# Patient Record
Sex: MALE | Race: WHITE | Employment: OTHER | URBAN - METROPOLITAN AREA
[De-identification: names, ages, dates, MRNs, and addresses within clinical notes are randomized per-mention and may not be internally consistent; named-entity substitution may affect disease eponyms.]

---

## 2020-09-22 NOTE — PERIOP NOTES
PATIENT'S WIFE CALLED AND MADE AWARE OF COVID-19 TESTING REQUIRED TO BE DONE WITHIN 96 HOURS OF SURGERY. COVID-19 TESTING APPOINTMENT MADE FOR PATIENT. PATIENT'S WIFE INSTRUCTED ON SELF QUARANTINE BETWEEN TESTING AND ARRIVAL TIME DAY OF SURGERY. INSTRUCTED NOT TO USE NASAL SPRAY OR NASAL OINTMENTS DAY OF TESTING, PRIOR TO TEST. LOCATION OF TESTING DISCUSSED WITH PATIENT'S WIFE.

## 2020-09-25 ENCOUNTER — HOSPITAL ENCOUNTER (OUTPATIENT)
Dept: PREADMISSION TESTING | Age: 65
Discharge: HOME | End: 2020-09-25
Payer: COMMERCIAL

## 2020-09-25 VITALS
TEMPERATURE: 98.8 F | HEART RATE: 75 BPM | SYSTOLIC BLOOD PRESSURE: 143 MMHG | HEIGHT: 69 IN | DIASTOLIC BLOOD PRESSURE: 90 MMHG | WEIGHT: 238.54 LBS | BODY MASS INDEX: 35.33 KG/M2

## 2020-09-25 DIAGNOSIS — Z01.812 PRE-PROCEDURE LAB EXAM: ICD-10-CM

## 2020-09-25 PROBLEM — S83.241A ACUTE MEDIAL MENISCUS TEAR OF RIGHT KNEE: Status: ACTIVE | Noted: 2020-09-25

## 2020-09-25 PROCEDURE — 93005 ELECTROCARDIOGRAM TRACING: CPT

## 2020-09-25 PROCEDURE — 87635 SARS-COV-2 COVID-19 AMP PRB: CPT

## 2020-09-25 RX ORDER — BUPROPION HYDROCHLORIDE 300 MG/1
300 TABLET ORAL
COMMUNITY

## 2020-09-25 RX ORDER — CITALOPRAM 40 MG/1
40 TABLET, FILM COATED ORAL DAILY
COMMUNITY

## 2020-09-25 RX ORDER — NAPROXEN SODIUM 220 MG
220 TABLET ORAL AS NEEDED
COMMUNITY
End: 2021-04-13

## 2020-09-25 RX ORDER — HYDROGEN PEROXIDE 3 %
20 SOLUTION, NON-ORAL MISCELLANEOUS DAILY
COMMUNITY

## 2020-09-25 RX ORDER — MINOCYCLINE HYDROCHLORIDE 50 MG/1
50 CAPSULE ORAL DAILY
COMMUNITY

## 2020-09-25 NOTE — H&P
PCP: Mine Campbell MD      Problem List      None        Subjective: There is no problem list on file for this patient.     Chief Complaint: Pain and Follow-up of the Right Knee     HPI: Vera Emanuel is a 60 y.o. male who returns for follow-up on right knee pain. He presents today for MRI results. He reports continued right knee pain.     Objective:      There were no vitals filed for this visit. Height: 5' 9\"       Wt Readings from Last 3 Encounters:   09/22/20 235 lb   09/10/20 235 lb   08/19/20 235 lb      Body mass index is 34.7 kg/m².     Musculoskeletal : He has normal range of motion of the right knee with no significant pain on motion. He has tenderness in the popliteal fossa compatible with a Baker's cyst. Ligaments intact. No effusion. Positive patellofemoral grind. Positive Seema's sign. Strong pedal pulses. No pedal edema. Skin healthy and intact. No apparent atrophy. Leg lengths appear equal.        Radiographs:             Mri Knee Right Without Contrast (20096)     Result Date: 9/17/2020  [facility] MRI INDICATION: Knee pain COMPARISON: None TECHNIQUE: Axial T2 fat-saturated and proton density fat-saturated; coronal T1 and proton density fat-saturated; and sagittal T2 fat-saturated, proton density fat-saturated, and gradient echo MRI of the right knee . CONTRAST:  None. FINDINGS: Bone marrow:     No acute fracture, dislocation, or marrow replacing process. Joint fluid: Mild joint effusion. Collateral ligaments and posterior, lateral corner: Mild edema seen along the MCL. The fibers are intact. The LCL is intact. Medial meniscus: There is high-grade tearing of the posterior root of the medial meniscus with partial meniscal extrusion. Degenerative signal is seen throughout the medial meniscus. Lateral meniscus: Intact. ACL and PCL: Intact. Tendons: There is a small focus of increased signal in the origin of the lateral head of the gastrocnemius related to degeneration. Otherwise intact.  Muscles: Within normal limits. Patellofemoral alignment: No patellar subluxation/tilt. Trochlear groove is not hypoplastic. TT-TG distance: 10 mm. Articular cartilage: There is high-grade cartilage loss throughout the lateral aspect of the patellofemoral compartment with areas of mild surrounding degenerative edema. There is a small superficial cartilage defect in the lateral tibial plateau measuring 4 mm. Soft tissue mass: None. IMPRESSION: 1. High-grade tearing of the posterior root of the medial meniscus with partial meniscal extrusion. Mild edema along the MCL is likely related to this. 2. Severe osteoarthritis in the lateral aspect of the patellofemoral compartment. Jessica Perdomo     Assessment:      1. Acute medial meniscal tear, right, initial encounter    2. Primary osteoarthritis of right knee       Plan:      I reviewed the MRI ordered of the right knee on 09/16/2020 with the patient. He has severe osteoarthritis in the lateral aspect of the patellofemoral compartment and high-grade tearing of the posterior root of the medial meniscus with partial meniscal extrusion. I discussed with the patient the diagnosis of meniscal tear. We discussed treatment options including continued conservative management vs surgery. He opts to proceed with R knee arthroscopy with partial menisectomy. Follow-up for scheduled surgery. I advised patient to let us know if he develops any new or worsening symptoms. Follow-up on a PRN basis.          Orders Placed This Encounter    Surgical Posting Sheet    BMI >=25 PATIENT INSTRUCTIONS & EDUCATION      Return for surgery.      Medical documentation was entered by me, Sanjuanita Granda, Medical Scribe for Dr. Josep Bingham.    9/22/2020     I, Lorrie Luna MD, personally, performed the services described in this documentation, as scribed in my presence, and it is both accurate and complete.         Electronically signed by Lorrie Luna MD at 9/24/2020  5:39 PM

## 2020-09-25 NOTE — PERIOP NOTES
Patient given surgical site infection information FAQs handout and hand hygiene tips sheet. Pre-operative instructions reviewed and patient verbalizes understanding of instructions. Patient has been given the opportunity to ask additional questions. PATIENT GIVEN 2 BOTTLES OF CHG SOAP TO USE DAY BEFORE SURGERY AND DOS. INSTRUCTIONS PROVIDED. PT ADVISED TO NOT EAT SOLID FOODS AFTER MIDNIGHT THE NIGHT BEFORE SURGERY INCLUDING CANDY,MINTS OR GUM. DO NOT DRINK ALCOHOL 24 HOURS BEFORE SURGERY. PT MAY DRINK CLEAR LIQUIDS FROM 12 MIDNIGHT UNTIL 1 HOUR PRIOR TO ARRIVAL. PT INSTRUCTED TO CALL REGISTRATION UPON ARRIVAL TO HOSPITAL DOS. PT IS SCHEDULED FOR COVID TESTING.

## 2020-09-26 LAB
ATRIAL RATE: 71 BPM
CALCULATED P AXIS, ECG09: 57 DEGREES
CALCULATED R AXIS, ECG10: -2 DEGREES
CALCULATED T AXIS, ECG11: 17 DEGREES
DIAGNOSIS, 93000: NORMAL
P-R INTERVAL, ECG05: 160 MS
Q-T INTERVAL, ECG07: 450 MS
QRS DURATION, ECG06: 96 MS
QTC CALCULATION (BEZET), ECG08: 489 MS
SARS-COV-2, COV2NT: NOT DETECTED
VENTRICULAR RATE, ECG03: 71 BPM

## 2020-09-28 ENCOUNTER — ANESTHESIA EVENT (OUTPATIENT)
Dept: MEDSURG UNIT | Age: 65
End: 2020-09-28
Payer: COMMERCIAL

## 2020-09-29 ENCOUNTER — HOSPITAL ENCOUNTER (OUTPATIENT)
Age: 65
Setting detail: OUTPATIENT SURGERY
Discharge: HOME | End: 2020-09-29
Attending: ORTHOPAEDIC SURGERY | Admitting: ORTHOPAEDIC SURGERY
Payer: COMMERCIAL

## 2020-09-29 ENCOUNTER — ANESTHESIA (OUTPATIENT)
Dept: MEDSURG UNIT | Age: 65
End: 2020-09-29
Payer: COMMERCIAL

## 2020-09-29 VITALS
HEART RATE: 84 BPM | DIASTOLIC BLOOD PRESSURE: 89 MMHG | WEIGHT: 238 LBS | OXYGEN SATURATION: 98 % | SYSTOLIC BLOOD PRESSURE: 124 MMHG | TEMPERATURE: 97.4 F | RESPIRATION RATE: 14 BRPM | BODY MASS INDEX: 35.25 KG/M2 | HEIGHT: 69 IN

## 2020-09-29 DIAGNOSIS — S83.241D ACUTE MEDIAL MENISCUS TEAR OF RIGHT KNEE, SUBSEQUENT ENCOUNTER: Primary | ICD-10-CM

## 2020-09-29 PROBLEM — M94.262 CHONDROMALACIA OF KNEE, LEFT: Chronic | Status: ACTIVE | Noted: 2020-09-29

## 2020-09-29 PROBLEM — S83.272A COMPLEX TEAR OF LATERAL MENISCUS OF LEFT KNEE AS CURRENT INJURY: Chronic | Status: ACTIVE | Noted: 2020-09-29

## 2020-09-29 PROCEDURE — 77030008684 HC TU ET CUF COVD -B: Performed by: ANESTHESIOLOGY

## 2020-09-29 PROCEDURE — 74011250636 HC RX REV CODE- 250/636: Performed by: ORTHOPAEDIC SURGERY

## 2020-09-29 PROCEDURE — 77030002916 HC SUT ETHLN J&J -A: Performed by: ORTHOPAEDIC SURGERY

## 2020-09-29 PROCEDURE — 97161 PT EVAL LOW COMPLEX 20 MIN: CPT

## 2020-09-29 PROCEDURE — 74011250636 HC RX REV CODE- 250/636: Performed by: ANESTHESIOLOGY

## 2020-09-29 PROCEDURE — 74011000250 HC RX REV CODE- 250: Performed by: ORTHOPAEDIC SURGERY

## 2020-09-29 PROCEDURE — 2709999900 HC NON-CHARGEABLE SUPPLY: Performed by: ORTHOPAEDIC SURGERY

## 2020-09-29 PROCEDURE — 77030020753 HC CUF TRNQT 1BLA STRY -B: Performed by: ORTHOPAEDIC SURGERY

## 2020-09-29 PROCEDURE — 74011250636 HC RX REV CODE- 250/636: Performed by: NURSE ANESTHETIST, CERTIFIED REGISTERED

## 2020-09-29 PROCEDURE — 77030018835 HC SOL IRR LR ICUM -A: Performed by: ORTHOPAEDIC SURGERY

## 2020-09-29 PROCEDURE — 77030040361 HC SLV COMPR DVT MDII -B: Performed by: ORTHOPAEDIC SURGERY

## 2020-09-29 PROCEDURE — 74011000250 HC RX REV CODE- 250: Performed by: NURSE ANESTHETIST, CERTIFIED REGISTERED

## 2020-09-29 PROCEDURE — 76210000037 HC AMBSU PH I REC 2 TO 2.5 HR: Performed by: ORTHOPAEDIC SURGERY

## 2020-09-29 PROCEDURE — 77030026438 HC STYL ET INTUB CARD -A: Performed by: ANESTHESIOLOGY

## 2020-09-29 PROCEDURE — 76060000061 HC AMB SURG ANES 0.5 TO 1 HR: Performed by: ORTHOPAEDIC SURGERY

## 2020-09-29 PROCEDURE — 76030000000 HC AMB SURG OR TIME 0.5 TO 1: Performed by: ORTHOPAEDIC SURGERY

## 2020-09-29 PROCEDURE — 77030006884 HC BLD SHV INCIS S&N -B: Performed by: ORTHOPAEDIC SURGERY

## 2020-09-29 PROCEDURE — 74011250637 HC RX REV CODE- 250/637: Performed by: ANESTHESIOLOGY

## 2020-09-29 RX ORDER — OXYCODONE HYDROCHLORIDE 5 MG/1
5 TABLET ORAL
Qty: 20 TAB | Refills: 0 | Status: SHIPPED | OUTPATIENT
Start: 2020-09-29 | End: 2020-10-03

## 2020-09-29 RX ORDER — CEFAZOLIN SODIUM 1 G/3ML
INJECTION, POWDER, FOR SOLUTION INTRAMUSCULAR; INTRAVENOUS AS NEEDED
Status: DISCONTINUED | OUTPATIENT
Start: 2020-09-29 | End: 2020-09-29 | Stop reason: HOSPADM

## 2020-09-29 RX ORDER — HYDROCODONE BITARTRATE AND ACETAMINOPHEN 5; 325 MG/1; MG/1
1 TABLET ORAL AS NEEDED
Status: DISCONTINUED | OUTPATIENT
Start: 2020-09-29 | End: 2020-09-29 | Stop reason: HOSPADM

## 2020-09-29 RX ORDER — SODIUM CHLORIDE, SODIUM LACTATE, POTASSIUM CHLORIDE, CALCIUM CHLORIDE 600; 310; 30; 20 MG/100ML; MG/100ML; MG/100ML; MG/100ML
INJECTION, SOLUTION INTRAVENOUS
Status: DISCONTINUED | OUTPATIENT
Start: 2020-09-29 | End: 2020-09-29 | Stop reason: HOSPADM

## 2020-09-29 RX ORDER — ONDANSETRON 2 MG/ML
INJECTION INTRAMUSCULAR; INTRAVENOUS AS NEEDED
Status: DISCONTINUED | OUTPATIENT
Start: 2020-09-29 | End: 2020-09-29 | Stop reason: HOSPADM

## 2020-09-29 RX ORDER — LIDOCAINE HYDROCHLORIDE AND EPINEPHRINE 10; 10 MG/ML; UG/ML
INJECTION, SOLUTION INFILTRATION; PERINEURAL AS NEEDED
Status: DISCONTINUED | OUTPATIENT
Start: 2020-09-29 | End: 2020-09-29 | Stop reason: HOSPADM

## 2020-09-29 RX ORDER — SODIUM CHLORIDE, SODIUM LACTATE, POTASSIUM CHLORIDE, CALCIUM CHLORIDE 600; 310; 30; 20 MG/100ML; MG/100ML; MG/100ML; MG/100ML
1000 INJECTION, SOLUTION INTRAVENOUS CONTINUOUS
Status: DISCONTINUED | OUTPATIENT
Start: 2020-09-29 | End: 2020-09-29 | Stop reason: HOSPADM

## 2020-09-29 RX ORDER — HYDROMORPHONE HYDROCHLORIDE 1 MG/ML
0.2 INJECTION, SOLUTION INTRAMUSCULAR; INTRAVENOUS; SUBCUTANEOUS
Status: DISCONTINUED | OUTPATIENT
Start: 2020-09-29 | End: 2020-09-29 | Stop reason: HOSPADM

## 2020-09-29 RX ORDER — MIDAZOLAM HYDROCHLORIDE 1 MG/ML
1 INJECTION, SOLUTION INTRAMUSCULAR; INTRAVENOUS AS NEEDED
Status: DISCONTINUED | OUTPATIENT
Start: 2020-09-29 | End: 2020-09-29 | Stop reason: HOSPADM

## 2020-09-29 RX ORDER — FENTANYL CITRATE 50 UG/ML
25 INJECTION, SOLUTION INTRAMUSCULAR; INTRAVENOUS
Status: COMPLETED | OUTPATIENT
Start: 2020-09-29 | End: 2020-09-29

## 2020-09-29 RX ORDER — ALBUTEROL SULFATE 0.83 MG/ML
2.5 SOLUTION RESPIRATORY (INHALATION) AS NEEDED
Status: DISCONTINUED | OUTPATIENT
Start: 2020-09-29 | End: 2020-09-29 | Stop reason: HOSPADM

## 2020-09-29 RX ORDER — ONDANSETRON 2 MG/ML
4 INJECTION INTRAMUSCULAR; INTRAVENOUS AS NEEDED
Status: DISCONTINUED | OUTPATIENT
Start: 2020-09-29 | End: 2020-09-29 | Stop reason: HOSPADM

## 2020-09-29 RX ORDER — ROCURONIUM BROMIDE 10 MG/ML
INJECTION, SOLUTION INTRAVENOUS AS NEEDED
Status: DISCONTINUED | OUTPATIENT
Start: 2020-09-29 | End: 2020-09-29 | Stop reason: HOSPADM

## 2020-09-29 RX ORDER — ACETAMINOPHEN 325 MG/1
650 TABLET ORAL ONCE
Status: COMPLETED | OUTPATIENT
Start: 2020-09-29 | End: 2020-09-29

## 2020-09-29 RX ORDER — FENTANYL CITRATE 50 UG/ML
50 INJECTION, SOLUTION INTRAMUSCULAR; INTRAVENOUS AS NEEDED
Status: DISCONTINUED | OUTPATIENT
Start: 2020-09-29 | End: 2020-09-29 | Stop reason: HOSPADM

## 2020-09-29 RX ORDER — GLYCOPYRROLATE 0.2 MG/ML
0.2 INJECTION INTRAMUSCULAR; INTRAVENOUS
Status: DISCONTINUED | OUTPATIENT
Start: 2020-09-29 | End: 2020-09-29 | Stop reason: HOSPADM

## 2020-09-29 RX ORDER — LIDOCAINE HYDROCHLORIDE 10 MG/ML
0.1 INJECTION, SOLUTION EPIDURAL; INFILTRATION; INTRACAUDAL; PERINEURAL AS NEEDED
Status: DISCONTINUED | OUTPATIENT
Start: 2020-09-29 | End: 2020-09-29 | Stop reason: HOSPADM

## 2020-09-29 RX ORDER — LIDOCAINE HYDROCHLORIDE 20 MG/ML
INJECTION, SOLUTION EPIDURAL; INFILTRATION; INTRACAUDAL; PERINEURAL AS NEEDED
Status: DISCONTINUED | OUTPATIENT
Start: 2020-09-29 | End: 2020-09-29 | Stop reason: HOSPADM

## 2020-09-29 RX ORDER — MIDAZOLAM HYDROCHLORIDE 1 MG/ML
0.5 INJECTION, SOLUTION INTRAMUSCULAR; INTRAVENOUS
Status: DISCONTINUED | OUTPATIENT
Start: 2020-09-29 | End: 2020-09-29 | Stop reason: HOSPADM

## 2020-09-29 RX ORDER — MORPHINE SULFATE 10 MG/ML
2 INJECTION, SOLUTION INTRAMUSCULAR; INTRAVENOUS
Status: DISCONTINUED | OUTPATIENT
Start: 2020-09-29 | End: 2020-09-29 | Stop reason: HOSPADM

## 2020-09-29 RX ORDER — MIDAZOLAM HYDROCHLORIDE 1 MG/ML
INJECTION, SOLUTION INTRAMUSCULAR; INTRAVENOUS AS NEEDED
Status: DISCONTINUED | OUTPATIENT
Start: 2020-09-29 | End: 2020-09-29 | Stop reason: HOSPADM

## 2020-09-29 RX ORDER — SODIUM CHLORIDE 9 MG/ML
25 INJECTION, SOLUTION INTRAVENOUS CONTINUOUS
Status: DISCONTINUED | OUTPATIENT
Start: 2020-09-29 | End: 2020-09-29 | Stop reason: HOSPADM

## 2020-09-29 RX ORDER — FENTANYL CITRATE 50 UG/ML
INJECTION, SOLUTION INTRAMUSCULAR; INTRAVENOUS AS NEEDED
Status: DISCONTINUED | OUTPATIENT
Start: 2020-09-29 | End: 2020-09-29 | Stop reason: HOSPADM

## 2020-09-29 RX ORDER — ASPIRIN 81 MG/1
81 TABLET ORAL 2 TIMES DAILY
Qty: 28 TAB | Refills: 0 | Status: SHIPPED | OUTPATIENT
Start: 2020-09-29 | End: 2021-04-13

## 2020-09-29 RX ORDER — PROPOFOL 10 MG/ML
INJECTION, EMULSION INTRAVENOUS AS NEEDED
Status: DISCONTINUED | OUTPATIENT
Start: 2020-09-29 | End: 2020-09-29 | Stop reason: HOSPADM

## 2020-09-29 RX ORDER — DEXAMETHASONE SODIUM PHOSPHATE 4 MG/ML
INJECTION, SOLUTION INTRA-ARTICULAR; INTRALESIONAL; INTRAMUSCULAR; INTRAVENOUS; SOFT TISSUE AS NEEDED
Status: DISCONTINUED | OUTPATIENT
Start: 2020-09-29 | End: 2020-09-29 | Stop reason: HOSPADM

## 2020-09-29 RX ORDER — ROPIVACAINE HYDROCHLORIDE 5 MG/ML
30 INJECTION, SOLUTION EPIDURAL; INFILTRATION; PERINEURAL AS NEEDED
Status: DISCONTINUED | OUTPATIENT
Start: 2020-09-29 | End: 2020-09-29 | Stop reason: HOSPADM

## 2020-09-29 RX ORDER — SUCCINYLCHOLINE CHLORIDE 20 MG/ML
INJECTION INTRAMUSCULAR; INTRAVENOUS AS NEEDED
Status: DISCONTINUED | OUTPATIENT
Start: 2020-09-29 | End: 2020-09-29 | Stop reason: HOSPADM

## 2020-09-29 RX ORDER — ROPIVACAINE HYDROCHLORIDE 5 MG/ML
INJECTION, SOLUTION EPIDURAL; INFILTRATION; PERINEURAL AS NEEDED
Status: DISCONTINUED | OUTPATIENT
Start: 2020-09-29 | End: 2020-09-29 | Stop reason: HOSPADM

## 2020-09-29 RX ORDER — DIPHENHYDRAMINE HYDROCHLORIDE 50 MG/ML
12.5 INJECTION, SOLUTION INTRAMUSCULAR; INTRAVENOUS AS NEEDED
Status: DISCONTINUED | OUTPATIENT
Start: 2020-09-29 | End: 2020-09-29 | Stop reason: HOSPADM

## 2020-09-29 RX ADMIN — DEXAMETHASONE SODIUM PHOSPHATE 6 MG: 4 INJECTION, SOLUTION INTRAMUSCULAR; INTRAVENOUS at 10:24

## 2020-09-29 RX ADMIN — PROPOFOL 150 MG: 10 INJECTION, EMULSION INTRAVENOUS at 10:14

## 2020-09-29 RX ADMIN — LIDOCAINE HYDROCHLORIDE 80 MG: 20 INJECTION, SOLUTION EPIDURAL; INFILTRATION; INTRACAUDAL; PERINEURAL at 10:14

## 2020-09-29 RX ADMIN — ROCURONIUM BROMIDE 5 MG: 10 SOLUTION INTRAVENOUS at 10:14

## 2020-09-29 RX ADMIN — SODIUM CHLORIDE, SODIUM LACTATE, POTASSIUM CHLORIDE, AND CALCIUM CHLORIDE 1000 ML: 600; 310; 30; 20 INJECTION, SOLUTION INTRAVENOUS at 08:53

## 2020-09-29 RX ADMIN — ONDANSETRON HYDROCHLORIDE 4 MG: 2 INJECTION, SOLUTION INTRAMUSCULAR; INTRAVENOUS at 10:44

## 2020-09-29 RX ADMIN — HYDROCODONE BITARTRATE AND ACETAMINOPHEN 1 TABLET: 5; 325 TABLET ORAL at 11:46

## 2020-09-29 RX ADMIN — FENTANYL CITRATE 25 MCG: 50 INJECTION, SOLUTION INTRAMUSCULAR; INTRAVENOUS at 11:24

## 2020-09-29 RX ADMIN — FENTANYL CITRATE 25 MCG: 50 INJECTION, SOLUTION INTRAMUSCULAR; INTRAVENOUS at 11:41

## 2020-09-29 RX ADMIN — MIDAZOLAM 2 MG: 1 INJECTION INTRAMUSCULAR; INTRAVENOUS at 10:06

## 2020-09-29 RX ADMIN — FENTANYL CITRATE 25 MCG: 50 INJECTION, SOLUTION INTRAMUSCULAR; INTRAVENOUS at 10:21

## 2020-09-29 RX ADMIN — SODIUM CHLORIDE, SODIUM LACTATE, POTASSIUM CHLORIDE, AND CALCIUM CHLORIDE: 600; 310; 30; 20 INJECTION, SOLUTION INTRAVENOUS at 10:04

## 2020-09-29 RX ADMIN — SUCCINYLCHOLINE CHLORIDE 200 MG: 20 INJECTION, SOLUTION INTRAMUSCULAR; INTRAVENOUS at 10:14

## 2020-09-29 RX ADMIN — FENTANYL CITRATE 25 MCG: 50 INJECTION, SOLUTION INTRAMUSCULAR; INTRAVENOUS at 11:12

## 2020-09-29 RX ADMIN — ACETAMINOPHEN 650 MG: 325 TABLET ORAL at 08:55

## 2020-09-29 RX ADMIN — ONDANSETRON HYDROCHLORIDE 4 MG: 2 INJECTION, SOLUTION INTRAMUSCULAR; INTRAVENOUS at 10:16

## 2020-09-29 RX ADMIN — FENTANYL CITRATE 50 MCG: 50 INJECTION, SOLUTION INTRAMUSCULAR; INTRAVENOUS at 10:14

## 2020-09-29 RX ADMIN — CEFAZOLIN 2 G: 330 INJECTION, POWDER, FOR SOLUTION INTRAMUSCULAR; INTRAVENOUS at 10:23

## 2020-09-29 RX ADMIN — FENTANYL CITRATE 25 MCG: 50 INJECTION, SOLUTION INTRAMUSCULAR; INTRAVENOUS at 10:52

## 2020-09-29 RX ADMIN — FENTANYL CITRATE 25 MCG: 50 INJECTION, SOLUTION INTRAMUSCULAR; INTRAVENOUS at 11:48

## 2020-09-29 NOTE — BRIEF OP NOTE
Brief Postoperative Note    Patient: Judie Moy  YOB: 1955  MRN: [id number]    Date of Procedure: 9/29/2020     Pre-Op Diagnosis: MEDIAL AND LATERA MENISCAL TEAR RIGHT KNEE WITH CHONDROMALACIA MEDIAL FEMORAL CONDYLE  Post-Op Diagnosis: Same as preoperative diagnosis.       Procedure(s):  RIGHT KNEE ARTHROSCOPIC PARTIAL MEDIAL AND PARTIAL LATERAL MENISCECTOMY    Surgeon(s):  Maddie Hodgson MD    Surgical Assistant: None    Anesthesia: General     Estimated Blood Loss (mL): Minimal    Complications: None    Specimens: * No specimens in log *     Implants: * No implants in log *    Drains: * No LDAs found *    Findings: as above    Electronically Signed by Joy Glasgow MD on 9/29/2020 at 10:56 AM

## 2020-09-29 NOTE — PROGRESS NOTES
Orders received, chart reviewed and patient evaluated by physical therapy. recommend:  No skilled physical therapy/ follow up rehabilitation needs identified at this time. Full evaluation to follow.

## 2020-09-29 NOTE — PROGRESS NOTES
PHYSICAL THERAPY EVALUATION & DISCHARGE  (AMBULATORY SURGERY, EMERGENCY ROOM & RECOVERY ROOM PATIENTS)  Patient: Renny Washington (63 y.o. male)  Date: 9/29/2020  Primary Diagnosis: MEDIAL MENICAL TEAR RIGHT KNEE  Procedure(s) (LRB):  RIGHT KNEE ARTHROSCOPIC PARTIAL MEDIAL AND PARTIAL LATERAL MENISCECTOMY (Right) Day of Surgery   Ordered Weight Bearing Status:  right as tolerated  Ordered Equipment: crutches    ASSESSMENT :   Based on the objective data described below, patient presents with Modified independent level overall for transfers. Gait training completed at Modified independent, 15 feet and using a crutches. Educated on proper technique using crutches while navigating stairs. Pt states no concerns. Adjusted crutches to proper height. Discharge recommendations: stairs with supervision      PLAN :  Skilled intervention and education completed. Discharging further skilled acute physical therapy at this time. SUBJECTIVE:   Patient stated I think I am good.     OBJECTIVE DATA SUMMARY:   HISTORY:    Past Medical History:   Diagnosis Date    Arrhythmia 2015    ATRIAL FLUTTER    Arthritis     GERD (gastroesophageal reflux disease)     H/O motion sickness     Psychiatric disorder     ANXIETY,DEPRESSION     Past Surgical History:   Procedure Laterality Date    HX CHOLECYSTECTOMY      HX COLONOSCOPY      HX ENDOSCOPY      HX GI      HX ORTHOPAEDIC Left     SHOULDER    HX ORTHOPAEDIC Left     THUMB    HX ORTHOPAEDIC Right     THUMB    HX TONSILLECTOMY       Prior Level of Function/Home Situation: independent   Personal factors and/or comorbidities impacting plan of care: n/a    Home Situation  [facility]    EXAMINATION/PRESENTATION/DECISION MAKING:   Critical Behavior:              Hearing:   Auditory  Auditory Impairment: None    Strength and Range Of Motion limitations:  Limited on RLE 2/2 surgery  Sensation:   Impaired on RLE    Transfers:  Overall level of assistance required following instruction: modified independence given verbal using axillary crutches. Ambulation/gait training:  Weight bearing status during ambulation: As tolerated     Distance (ft): 15 Feet (ft)  Assistive Device: Crutches  Ambulation - Level of Assistance: Modified independent, Adaptive equipment, Additional time       Stair Management:             Functional Measure:  Barthel Index:    Bathin  Bladder: 10  Bowels: 10  Groomin  Dressin  Feeding: 10  Mobility: 15  Stairs: 5  Toilet Use: 10  Transfer (Bed to Chair and Back): 15  Total: 90/100       The Barthel ADL Index: Guidelines  1. The index should be used as a record of what a patient does, not as a record of what a patient could do. 2. The main aim is to establish degree of independence from any help, physical or verbal, however minor and for whatever reason. 3. The need for supervision renders the patient not independent. 4. A patient's performance should be established using the best available evidence. Asking the patient, friends/relatives and nurses are the usual sources, but direct observation and common sense are also important. However direct testing is not needed. 5. Usually the patient's performance over the preceding 24-48 hours is important, but occasionally longer periods will be relevant. 6. Middle categories imply that the patient supplies over 50 per cent of the effort. 7. Use of aids to be independent is allowed. Juanito Estrella., Barthel, D.W. (9860). Functional evaluation: the Barthel Index. [address] [facility] (14)2. [facility] MARY Alvarenga, Warren Anaya., Pantera Ambrocio.[address] (). Measuring the change indisability after inpatient rehabilitation; comparison of the responsiveness of the Barthel Index and Functional [place] Measure. Journal of Neurology, Neurosurgery, and Psychiatry, 66(4), 251-601.   Praveena Raines, CELESTINE.LILLIAN.A, NURIA Valente, Ethel Guzman, M.A. (2004.) Assessment of post-stroke quality of life in cost-effectiveness studies: The usefulness of the Barthel Index and the EuroQoL-5D. Quality of Life Research, 15, 789-83            Physical Therapy Evaluation Charge Determination   History Examination Presentation Decision-Making   MEDIUM  Complexity : 1-2 comorbidities / personal factors will impact the outcome/ POC  LOW Complexity : 1-2 Standardized tests and measures addressing body structure, function, activity limitation and / or participation in recreation  LOW Complexity : Stable, uncomplicated  Other outcome measures barthel   LOW       Based on the above components, the patient evaluation is determined to be of the following complexity level: LOW         Activity Tolerance:   Good  Please refer to the flowsheet for vital signs taken during this treatment. After treatment patient left:   Up in chair     COMMUNICATION/EDUCATION:   Role of physical therapy explained to the patient. The patients plan of care was discussed with: Registered Nurse.      Topics addressed: Comments:   [x]                                    Device use and technique    [x]                                    Transfer technique    [x]                                    Gait training    []                                    Stair training deferred     Thank you for this referral.    Aretha Manzo, PT, DPT   Time Calculation: 10 mins

## 2020-09-29 NOTE — OP NOTES
[address]   OPERATIVE REPORT    Name:  Shirin Berman  MR#:  [id number]  :  1955  ACCOUNT #:  [de-identified]  DATE OF SERVICE:  2020      PREOPERATIVE DIAGNOSIS:  Right knee medial meniscal tear. POSTOPERATIVE DIAGNOSES:  Right knee medial meniscal tear, lateral meniscal tear, chondromalacia medial femoral condyle, chondromalacia patellofemoral joint. PROCEDURES PERFORMED:  Right knee arthroscopic partial medial and partial lateral meniscectomy with chondroplasty of medial femoral condyle. SURGEON:  Sadie Wilcox MD    ASSISTANT:  None. ANESTHESIA:  General.    COMPLICATIONS:  None. SPECIMENS REMOVED:  None. IMPLANTS:  None. ESTIMATED BLOOD LOSS:  Minimal.    INDICATIONS:  A 44-year-old gentleman with pain and mechanical symptoms localized to the medial joint, not responsive to conservative management with a positive MRI scan. OPERATION:  The patient was taken to the operating room and underwent general inhalational anesthesia. Right knee was prepped and draped in the usual fashion. The 2 proposed portal sites and joint space were infiltrated with 1% lidocaine with epinephrine. The tourniquet was in place, however, was not utilized. The anterolateral portal was established. Scope introduced in the patellofemoral joint. Grade III and grade IV chondromalacia were noted on the undersurface of the patella as well as the femoral trochlea. Scope was advanced down the medial gutter into the medial joint. The anteromedial portal was established under direct vision. There was a complex tear of the posterior horn of the medial meniscus. There were grade III changes on the medial femoral condyle with unstable flaps of articular cartilage. There were grade III and grade IV changes on the medial tibial plateau. The ACL was intact. There was some complex tearing of the lateral meniscus but minimal chondromalacia.   The incisor blade was introduced and I proceeded with a partial medial meniscectomy removing any unstable portions of the posterior horn of the medial meniscus. I also debrided the unstable flaps of articular cartilage on the medial femoral condyle. The ACL was intact. The incisor blade was used to debride the degenerative tears of the lateral meniscus. The joint was irrigated out and I removed all portals and saline solution. Portal sites were approximated using 4-0 nylon in simple fashion. Portal sites and joint space were infiltrated with 0.5% ropivacaine. Bulky sterile dressing was applied and the patient was awakened, extubated, and transferred to the recovery room in stable condition. There were no complications.         Akin Saldana MD      [id number]  D:  09/29/2020 11:02  T:  09/29/2020 12:32  JOB #:  [id number]

## 2020-09-29 NOTE — PERIOP NOTES
I have reviewed discharge instructions with the patient and spouse. The patient and spouse verbalized understanding. All questions were answered. Patient discharged in stable condition with belongings.

## 2020-09-29 NOTE — ANESTHESIA PREPROCEDURE EVALUATION
Relevant Problems   No relevant active problems       Anesthetic History   No history of anesthetic complications            Review of Systems / Medical History  Patient summary reviewed, nursing notes reviewed and pertinent labs reviewed    Pulmonary  Within defined limits                 Neuro/Psych   Within defined limits           Cardiovascular  Within defined limits                     GI/Hepatic/Renal     GERD: well controlled           Endo/Other        Arthritis     Other Findings              Physical Exam    Airway  Mallampati: II  TM Distance: > 6 cm  Neck ROM: normal range of motion   Mouth opening: Normal     Cardiovascular  Regular rate and rhythm,  S1 and S2 normal,  no murmur, click, rub, or gallop             Dental  No notable dental hx       Pulmonary  Breath sounds clear to auscultation               Abdominal  GI exam deferred       Other Findings            Anesthetic Plan    ASA: 2  Anesthesia type: general          Induction: Intravenous  Anesthetic plan and risks discussed with: Patient

## 2020-09-29 NOTE — ROUTINE PROCESS
Patient: Jose G Ellis MRN: [id number]  SSN: [id number]   YOB: 1955  Age: 60 y.o. Sex: male     Patient is status post Procedure(s):  RIGHT KNEE ARTHROSCOPIC PARTIAL MEDIAL AND PARTIAL LATERAL MENISCECTOMY.     Surgeon(s) and Role:     * Shavon Almanzar MD - Primary    Local/Dose/Irrigation:  SEE MAR                  Peripheral IV 09/29/20 Left Hand (Active)   Site Assessment Clean, dry, & intact 09/29/20 0851   Phlebitis Assessment 0 09/29/20 0851   Infiltration Assessment 0 09/29/20 0851   Dressing Status Clean, dry, & intact 09/29/20 0851            Airway - Endotracheal Tube 09/29/20 Oral (Active)                   Dressing/Packing:  Wound Knee Right-Dressing Type: 4 x 4;Cast padding;Elastic bandage (09/29/20 1036)    Splint/Cast:  ]    Other:

## 2020-09-29 NOTE — ANESTHESIA POSTPROCEDURE EVALUATION
Post-Anesthesia Evaluation and Assessment    Patient: Tennille Preciado MRN: [id number]  SSN: [id number]    YOB: 1955  Age: 60 y.o. Sex: male      I have evaluated the patient and they are stable and ready for discharge from the PACU. Cardiovascular Function/Vital Signs  Visit Vitals  /78   Pulse 84   Temp 36.3 °C (97.4 °F)   Resp 14   Ht 5' 9\" (1.753 m)   Wt 108 kg (238 lb)   SpO2 96%   BMI 35.15 kg/m²       Patient is status post General anesthesia for Procedure(s):  RIGHT KNEE ARTHROSCOPIC PARTIAL MEDIAL AND PARTIAL LATERAL MENISCECTOMY. Nausea/Vomiting: None    Postoperative hydration reviewed and adequate. Pain:  Pain Scale 1: Numeric (0 - 10) (09/29/20 1124)  Pain Intensity 1: 7 (09/29/20 1124)   Managed    Neurological Status:   Neuro (WDL): Within Defined Limits (09/29/20 1056)   At baseline    Mental Status, Level of Consciousness: Alert and  oriented to person, place, and time    Pulmonary Status:   O2 Device: Nasal cannula (09/29/20 1056)   Adequate oxygenation and airway patent    Complications related to anesthesia: None    Post-anesthesia assessment completed. No concerns    Signed By: Ezequiel Scherer MD     September 29, 2020              Procedure(s):  RIGHT KNEE ARTHROSCOPIC PARTIAL MEDIAL AND PARTIAL LATERAL MENISCECTOMY. general    <BSHSIANPOST>    INITIAL Post-op Vital signs:   Vitals Value Taken Time   /87 9/29/2020 11:15 AM   Temp 36.3 °C (97.4 °F) 9/29/2020 10:56 AM   Pulse 78 9/29/2020 11:21 AM   Resp 13 9/29/2020 11:21 AM   SpO2 94 % 9/29/2020 11:21 AM   Vitals shown include unvalidated device data.

## 2021-04-13 ENCOUNTER — HOSPITAL ENCOUNTER (OUTPATIENT)
Dept: PREADMISSION TESTING | Age: 66
Discharge: HOME | End: 2021-04-13
Payer: COMMERCIAL

## 2021-04-13 ENCOUNTER — HOSPITAL ENCOUNTER (OUTPATIENT)
Dept: GENERAL RADIOLOGY | Age: 66
Discharge: HOME | End: 2021-04-13
Attending: UROLOGY
Payer: COMMERCIAL

## 2021-04-13 VITALS
HEART RATE: 79 BPM | DIASTOLIC BLOOD PRESSURE: 95 MMHG | TEMPERATURE: 97.7 F | SYSTOLIC BLOOD PRESSURE: 147 MMHG | BODY MASS INDEX: 34.35 KG/M2 | HEIGHT: 69 IN | WEIGHT: 231.92 LBS

## 2021-04-13 LAB
ALBUMIN SERPL-MCNC: 3.9 G/DL (ref 3.5–5)
ALBUMIN/GLOB SERPL: 1.4 {RATIO} (ref 1.1–2.2)
ALP SERPL-CCNC: 95 U/L (ref 45–117)
ALT SERPL-CCNC: 34 U/L (ref 12–78)
ANION GAP SERPL CALC-SCNC: 7 MMOL/L (ref 5–15)
APPEARANCE UR: CLEAR
APTT PPP: 25.9 SEC (ref 22.1–31)
AST SERPL-CCNC: 23 U/L (ref 15–37)
ATRIAL RATE: 73 BPM
BACTERIA URNS QL MICRO: NEGATIVE /HPF
BASOPHILS # BLD: 0.1 K/UL (ref 0–0.1)
BASOPHILS NFR BLD: 2 % (ref 0–1)
BILIRUB SERPL-MCNC: 0.6 MG/DL (ref 0.2–1)
BILIRUB UR QL: NEGATIVE
BUN SERPL-MCNC: 13 MG/DL (ref 6–20)
BUN/CREAT SERPL: 12 (ref 12–20)
CALCIUM SERPL-MCNC: 9.1 MG/DL (ref 8.5–10.1)
CALCULATED P AXIS, ECG09: 53 DEGREES
CALCULATED R AXIS, ECG10: -4 DEGREES
CALCULATED T AXIS, ECG11: 19 DEGREES
CHLORIDE SERPL-SCNC: 105 MMOL/L (ref 97–108)
CO2 SERPL-SCNC: 26 MMOL/L (ref 21–32)
COLOR UR: NORMAL
CREAT SERPL-MCNC: 1.05 MG/DL (ref 0.7–1.3)
DIAGNOSIS, 93000: NORMAL
DIFFERENTIAL METHOD BLD: ABNORMAL
EOSINOPHIL # BLD: 0.2 K/UL (ref 0–0.4)
EOSINOPHIL NFR BLD: 4 % (ref 0–7)
EPITH CASTS URNS QL MICRO: NORMAL /LPF
ERYTHROCYTE [DISTWIDTH] IN BLOOD BY AUTOMATED COUNT: 12.8 % (ref 11.5–14.5)
GLOBULIN SER CALC-MCNC: 2.7 G/DL (ref 2–4)
GLUCOSE SERPL-MCNC: 109 MG/DL (ref 65–100)
GLUCOSE UR STRIP.AUTO-MCNC: NEGATIVE MG/DL
HCT VFR BLD AUTO: 45.2 % (ref 36.6–50.3)
HGB BLD-MCNC: 15 G/DL (ref 12.1–17)
HGB UR QL STRIP: NEGATIVE
HYALINE CASTS URNS QL MICRO: NORMAL /LPF (ref 0–5)
IMM GRANULOCYTES # BLD AUTO: 0 K/UL (ref 0–0.04)
IMM GRANULOCYTES NFR BLD AUTO: 0 % (ref 0–0.5)
INR PPP: 1 (ref 0.9–1.1)
KETONES UR QL STRIP.AUTO: NEGATIVE MG/DL
LEUKOCYTE ESTERASE UR QL STRIP.AUTO: NEGATIVE
LYMPHOCYTES # BLD: 1.7 K/UL (ref 0.8–3.5)
LYMPHOCYTES NFR BLD: 33 % (ref 12–49)
MCH RBC QN AUTO: 28.6 PG (ref 26–34)
MCHC RBC AUTO-ENTMCNC: 33.2 G/DL (ref 30–36.5)
MCV RBC AUTO: 86.1 FL (ref 80–99)
MONOCYTES # BLD: 0.4 K/UL (ref 0–1)
MONOCYTES NFR BLD: 9 % (ref 5–13)
NEUTS SEG # BLD: 2.7 K/UL (ref 1.8–8)
NEUTS SEG NFR BLD: 52 % (ref 32–75)
NITRITE UR QL STRIP.AUTO: NEGATIVE
NRBC # BLD: 0 K/UL (ref 0–0.01)
NRBC BLD-RTO: 0 PER 100 WBC
P-R INTERVAL, ECG05: 156 MS
PH UR STRIP: 5 [PH] (ref 5–8)
PLATELET # BLD AUTO: 242 K/UL (ref 150–400)
PMV BLD AUTO: 9.8 FL (ref 8.9–12.9)
POTASSIUM SERPL-SCNC: 4.2 MMOL/L (ref 3.5–5.1)
PROT SERPL-MCNC: 6.6 G/DL (ref 6.4–8.2)
PROT UR STRIP-MCNC: NEGATIVE MG/DL
PROTHROMBIN TIME: 10.2 SEC (ref 9–11.1)
Q-T INTERVAL, ECG07: 440 MS
QRS DURATION, ECG06: 92 MS
QTC CALCULATION (BEZET), ECG08: 484 MS
RBC # BLD AUTO: 5.25 M/UL (ref 4.1–5.7)
RBC #/AREA URNS HPF: NORMAL /HPF (ref 0–5)
SODIUM SERPL-SCNC: 138 MMOL/L (ref 136–145)
SP GR UR REFRACTOMETRY: 1.01 (ref 1–1.03)
THERAPEUTIC RANGE,PTTT: NORMAL SECS (ref 58–77)
UA: UC IF INDICATED,UAUC: NORMAL
UROBILINOGEN UR QL STRIP.AUTO: 0.2 EU/DL (ref 0.2–1)
VENTRICULAR RATE, ECG03: 73 BPM
WBC # BLD AUTO: 5.1 K/UL (ref 4.1–11.1)
WBC URNS QL MICRO: NORMAL /HPF (ref 0–4)

## 2021-04-13 PROCEDURE — 71046 X-RAY EXAM CHEST 2 VIEWS: CPT

## 2021-04-13 PROCEDURE — 93005 ELECTROCARDIOGRAM TRACING: CPT

## 2021-04-13 PROCEDURE — 85025 COMPLETE CBC W/AUTO DIFF WBC: CPT

## 2021-04-13 PROCEDURE — 81001 URINALYSIS AUTO W/SCOPE: CPT

## 2021-04-13 PROCEDURE — 85610 PROTHROMBIN TIME: CPT

## 2021-04-13 PROCEDURE — 80053 COMPREHEN METABOLIC PANEL: CPT

## 2021-04-13 PROCEDURE — 85730 THROMBOPLASTIN TIME PARTIAL: CPT

## 2021-04-13 PROCEDURE — 36415 COLL VENOUS BLD VENIPUNCTURE: CPT

## 2021-04-13 NOTE — PERIOP NOTES
PATIENT GIVEN WRITTEN INSTRUCTIONS TO GO TO THE IMAGING CENTER/CANCER CENTER [address] [facility] [address] TO HAVE CHEST XRAY COMPLETED. PATIENT MADE AWARE OF NEED FOR COVID-19 TESTING WITHIN 96 HOURS OF SURGERY. PATIENT INSTRUCTED TO EXPECT A CALL TO SCHEDULE APPT FOR TEST. PATIENT INSTRUCTED TO SELF QUARANTINE BETWEEN TESTING AND ARRIVAL TIME DAY OF SURGERY. Patient verbalizes understanding of preoperative instructions:  Given skin prep chlorhexidine wipes-given written and verbal instructions on use. Patient given surgical site infection FAQs handout and hand hygiene tips sheet. Pre-operative instructions reviewed and patient verbalizes understanding of instructions. Patient has been given the opportunity to ask additional questions.

## 2021-04-16 ENCOUNTER — TRANSCRIBE ORDER (OUTPATIENT)
Dept: REGISTRATION | Age: 66
End: 2021-04-16

## 2021-04-16 ENCOUNTER — HOSPITAL ENCOUNTER (OUTPATIENT)
Dept: PREADMISSION TESTING | Age: 66
Discharge: HOME | End: 2021-04-16
Payer: COMMERCIAL

## 2021-04-16 DIAGNOSIS — Z01.812 PRE-PROCEDURE LAB EXAM: ICD-10-CM

## 2021-04-16 DIAGNOSIS — Z01.812 PRE-PROCEDURE LAB EXAM: Primary | ICD-10-CM

## 2021-04-16 PROCEDURE — U0003 INFECTIOUS AGENT DETECTION BY NUCLEIC ACID (DNA OR RNA); SEVERE ACUTE RESPIRATORY SYNDROME CORONAVIRUS 2 (SARS-COV-2) (CORONAVIRUS DISEASE [COVID-19]), AMPLIFIED PROBE TECHNIQUE, MAKING USE OF HIGH THROUGHPUT TECHNOLOGIES AS DESCRIBED BY CMS-2020-01-R: HCPCS

## 2021-04-17 LAB — SARS-COV-2, COV2NT: NOT DETECTED

## 2021-04-20 ENCOUNTER — ANESTHESIA EVENT (OUTPATIENT)
Dept: SURGERY | Age: 66
End: 2021-04-20
Payer: COMMERCIAL

## 2021-04-20 ENCOUNTER — HOSPITAL ENCOUNTER (OUTPATIENT)
Age: 66
Discharge: HOME | End: 2021-04-21
Attending: UROLOGY | Admitting: UROLOGY
Payer: COMMERCIAL

## 2021-04-20 ENCOUNTER — ANESTHESIA (OUTPATIENT)
Dept: SURGERY | Age: 66
End: 2021-04-20
Payer: COMMERCIAL

## 2021-04-20 DIAGNOSIS — C61 PROSTATE CANCER (HCC): Primary | ICD-10-CM

## 2021-04-20 PROCEDURE — 74011000250 HC RX REV CODE- 250: Performed by: UROLOGY

## 2021-04-20 PROCEDURE — 74011000250 HC RX REV CODE- 250: Performed by: NURSE ANESTHETIST, CERTIFIED REGISTERED

## 2021-04-20 PROCEDURE — 99218 HC RM OBSERVATION: CPT

## 2021-04-20 PROCEDURE — 74011250636 HC RX REV CODE- 250/636: Performed by: NURSE ANESTHETIST, CERTIFIED REGISTERED

## 2021-04-20 PROCEDURE — 77030018832 HC SOL IRR H20 ICUM -A: Performed by: UROLOGY

## 2021-04-20 PROCEDURE — 77030002966 HC SUT PDS J&J -A: Performed by: UROLOGY

## 2021-04-20 PROCEDURE — 74011000250 HC RX REV CODE- 250: Performed by: ANESTHESIOLOGY

## 2021-04-20 PROCEDURE — 77030040361 HC SLV COMPR DVT MDII -B: Performed by: UROLOGY

## 2021-04-20 PROCEDURE — 77030013079 HC BLNKT BAIR HGGR 3M -A: Performed by: ANESTHESIOLOGY

## 2021-04-20 PROCEDURE — 76060000037 HC ANESTHESIA 3 TO 3.5 HR: Performed by: UROLOGY

## 2021-04-20 PROCEDURE — 77030027138 HC INCENT SPIROMETER -A

## 2021-04-20 PROCEDURE — 77030022704 HC SUT VLOC COVD -B: Performed by: UROLOGY

## 2021-04-20 PROCEDURE — 77030008756 HC TU IRR SUC STRY -B: Performed by: UROLOGY

## 2021-04-20 PROCEDURE — 77030020703 HC SEAL CANN DISP INTU -B: Performed by: UROLOGY

## 2021-04-20 PROCEDURE — 77030040922 HC BLNKT HYPOTHRM STRY -A

## 2021-04-20 PROCEDURE — 77030012893

## 2021-04-20 PROCEDURE — 77030008684 HC TU ET CUF COVD -B: Performed by: ANESTHESIOLOGY

## 2021-04-20 PROCEDURE — 77030010507 HC ADH SKN DERMBND J&J -B: Performed by: UROLOGY

## 2021-04-20 PROCEDURE — 74011250636 HC RX REV CODE- 250/636: Performed by: ANESTHESIOLOGY

## 2021-04-20 PROCEDURE — 77030002933 HC SUT MCRYL J&J -A: Performed by: UROLOGY

## 2021-04-20 PROCEDURE — 77030003578 HC NDL INSUF VERES AMR -B: Performed by: UROLOGY

## 2021-04-20 PROCEDURE — 74011250636 HC RX REV CODE- 250/636: Performed by: UROLOGY

## 2021-04-20 PROCEDURE — 77030026438 HC STYL ET INTUB CARD -A: Performed by: ANESTHESIOLOGY

## 2021-04-20 PROCEDURE — 77030035277 HC OBTRTR BLDELSS DISP INTU -B: Performed by: UROLOGY

## 2021-04-20 PROCEDURE — 77030007955 HC PCH ENDOSC SPEC J&J -B: Performed by: UROLOGY

## 2021-04-20 PROCEDURE — 36415 COLL VENOUS BLD VENIPUNCTURE: CPT

## 2021-04-20 PROCEDURE — 88309 TISSUE EXAM BY PATHOLOGIST: CPT

## 2021-04-20 PROCEDURE — 77030033730 HC CLP LAPRO ABS LPW COVD -C: Performed by: UROLOGY

## 2021-04-20 PROCEDURE — 77030031492 HC PRT ACC BLNT AIRSEAL CNMD -B: Performed by: UROLOGY

## 2021-04-20 PROCEDURE — 88307 TISSUE EXAM BY PATHOLOGIST: CPT

## 2021-04-20 PROCEDURE — 76010000878 HC OR TIME 3 TO 3.5HR INTENSV - TIER 2: Performed by: UROLOGY

## 2021-04-20 PROCEDURE — 77030016151 HC PROTCTR LNS DFOG COVD -B: Performed by: UROLOGY

## 2021-04-20 PROCEDURE — 2709999900 HC NON-CHARGEABLE SUPPLY: Performed by: UROLOGY

## 2021-04-20 PROCEDURE — 77030031139 HC SUT VCRL2 J&J -A: Performed by: UROLOGY

## 2021-04-20 PROCEDURE — 76210000001 HC OR PH I REC 2.5 TO 3 HR: Performed by: UROLOGY

## 2021-04-20 PROCEDURE — 74011250637 HC RX REV CODE- 250/637: Performed by: UROLOGY

## 2021-04-20 RX ORDER — MIDAZOLAM HYDROCHLORIDE 1 MG/ML
1 INJECTION, SOLUTION INTRAMUSCULAR; INTRAVENOUS AS NEEDED
Status: DISCONTINUED | OUTPATIENT
Start: 2021-04-20 | End: 2021-04-20 | Stop reason: HOSPADM

## 2021-04-20 RX ORDER — HYDROMORPHONE HYDROCHLORIDE 2 MG/ML
INJECTION, SOLUTION INTRAMUSCULAR; INTRAVENOUS; SUBCUTANEOUS AS NEEDED
Status: DISCONTINUED | OUTPATIENT
Start: 2021-04-20 | End: 2021-04-20 | Stop reason: HOSPADM

## 2021-04-20 RX ORDER — ONDANSETRON 2 MG/ML
4 INJECTION INTRAMUSCULAR; INTRAVENOUS AS NEEDED
Status: DISCONTINUED | OUTPATIENT
Start: 2021-04-20 | End: 2021-04-20 | Stop reason: HOSPADM

## 2021-04-20 RX ORDER — SODIUM CHLORIDE 0.9 % (FLUSH) 0.9 %
5-40 SYRINGE (ML) INJECTION AS NEEDED
Status: DISCONTINUED | OUTPATIENT
Start: 2021-04-20 | End: 2021-04-21 | Stop reason: HOSPADM

## 2021-04-20 RX ORDER — MIDAZOLAM HYDROCHLORIDE 1 MG/ML
0.5 INJECTION, SOLUTION INTRAMUSCULAR; INTRAVENOUS
Status: COMPLETED | OUTPATIENT
Start: 2021-04-20 | End: 2021-04-20

## 2021-04-20 RX ORDER — ONDANSETRON 2 MG/ML
INJECTION INTRAMUSCULAR; INTRAVENOUS AS NEEDED
Status: DISCONTINUED | OUTPATIENT
Start: 2021-04-20 | End: 2021-04-20 | Stop reason: HOSPADM

## 2021-04-20 RX ORDER — HYDROCODONE BITARTRATE AND ACETAMINOPHEN 5; 325 MG/1; MG/1
1 TABLET ORAL
Status: DISCONTINUED | OUTPATIENT
Start: 2021-04-20 | End: 2021-04-21 | Stop reason: HOSPADM

## 2021-04-20 RX ORDER — SODIUM CHLORIDE 0.9 % (FLUSH) 0.9 %
5-40 SYRINGE (ML) INJECTION AS NEEDED
Status: DISCONTINUED | OUTPATIENT
Start: 2021-04-20 | End: 2021-04-20 | Stop reason: HOSPADM

## 2021-04-20 RX ORDER — FENTANYL CITRATE 50 UG/ML
INJECTION, SOLUTION INTRAMUSCULAR; INTRAVENOUS AS NEEDED
Status: DISCONTINUED | OUTPATIENT
Start: 2021-04-20 | End: 2021-04-20 | Stop reason: HOSPADM

## 2021-04-20 RX ORDER — SODIUM CHLORIDE 0.9 % (FLUSH) 0.9 %
5-40 SYRINGE (ML) INJECTION EVERY 8 HOURS
Status: DISCONTINUED | OUTPATIENT
Start: 2021-04-20 | End: 2021-04-20 | Stop reason: HOSPADM

## 2021-04-20 RX ORDER — SODIUM CHLORIDE, SODIUM LACTATE, POTASSIUM CHLORIDE, CALCIUM CHLORIDE 600; 310; 30; 20 MG/100ML; MG/100ML; MG/100ML; MG/100ML
75 INJECTION, SOLUTION INTRAVENOUS CONTINUOUS
Status: DISCONTINUED | OUTPATIENT
Start: 2021-04-20 | End: 2021-04-20 | Stop reason: HOSPADM

## 2021-04-20 RX ORDER — GLYCOPYRROLATE 0.2 MG/ML
INJECTION INTRAMUSCULAR; INTRAVENOUS AS NEEDED
Status: DISCONTINUED | OUTPATIENT
Start: 2021-04-20 | End: 2021-04-20 | Stop reason: HOSPADM

## 2021-04-20 RX ORDER — FENTANYL CITRATE 50 UG/ML
25 INJECTION, SOLUTION INTRAMUSCULAR; INTRAVENOUS
Status: DISCONTINUED | OUTPATIENT
Start: 2021-04-20 | End: 2021-04-20 | Stop reason: HOSPADM

## 2021-04-20 RX ORDER — CITALOPRAM 20 MG/1
20 TABLET, FILM COATED ORAL DAILY
Status: DISCONTINUED | OUTPATIENT
Start: 2021-04-21 | End: 2021-04-21 | Stop reason: HOSPADM

## 2021-04-20 RX ORDER — BUPIVACAINE HYDROCHLORIDE 5 MG/ML
30 INJECTION, SOLUTION EPIDURAL; INTRACAUDAL ONCE
Status: COMPLETED | OUTPATIENT
Start: 2021-04-20 | End: 2021-04-20

## 2021-04-20 RX ORDER — SODIUM CHLORIDE 9 MG/ML
50 INJECTION, SOLUTION INTRAVENOUS CONTINUOUS
Status: DISCONTINUED | OUTPATIENT
Start: 2021-04-20 | End: 2021-04-20 | Stop reason: HOSPADM

## 2021-04-20 RX ORDER — ATROPA BELLADONNA AND OPIUM 16.2; 6 MG/1; MG/1
SUPPOSITORY RECTAL AS NEEDED
Status: DISCONTINUED | OUTPATIENT
Start: 2021-04-20 | End: 2021-04-20 | Stop reason: HOSPADM

## 2021-04-20 RX ORDER — DIPHENHYDRAMINE HYDROCHLORIDE 50 MG/ML
12.5 INJECTION, SOLUTION INTRAMUSCULAR; INTRAVENOUS AS NEEDED
Status: DISCONTINUED | OUTPATIENT
Start: 2021-04-20 | End: 2021-04-20 | Stop reason: HOSPADM

## 2021-04-20 RX ORDER — DEXTROSE, SODIUM CHLORIDE, AND POTASSIUM CHLORIDE 5; .45; .15 G/100ML; G/100ML; G/100ML
150 INJECTION INTRAVENOUS CONTINUOUS
Status: DISPENSED | OUTPATIENT
Start: 2021-04-20 | End: 2021-04-21

## 2021-04-20 RX ORDER — ROCURONIUM BROMIDE 10 MG/ML
INJECTION, SOLUTION INTRAVENOUS AS NEEDED
Status: DISCONTINUED | OUTPATIENT
Start: 2021-04-20 | End: 2021-04-20 | Stop reason: HOSPADM

## 2021-04-20 RX ORDER — NEOSTIGMINE METHYLSULFATE 1 MG/ML
INJECTION INTRAVENOUS AS NEEDED
Status: DISCONTINUED | OUTPATIENT
Start: 2021-04-20 | End: 2021-04-20 | Stop reason: HOSPADM

## 2021-04-20 RX ORDER — HYDROCODONE BITARTRATE AND ACETAMINOPHEN 5; 325 MG/1; MG/1
1 TABLET ORAL
Qty: 10 TAB | Refills: 0 | Status: SHIPPED | OUTPATIENT
Start: 2021-04-20 | End: 2021-04-22

## 2021-04-20 RX ORDER — KETOROLAC TROMETHAMINE 30 MG/ML
30 INJECTION, SOLUTION INTRAMUSCULAR; INTRAVENOUS EVERY 6 HOURS
Status: DISPENSED | OUTPATIENT
Start: 2021-04-20 | End: 2021-04-21

## 2021-04-20 RX ORDER — PANTOPRAZOLE SODIUM 40 MG/1
40 TABLET, DELAYED RELEASE ORAL
Status: DISCONTINUED | OUTPATIENT
Start: 2021-04-21 | End: 2021-04-21 | Stop reason: HOSPADM

## 2021-04-20 RX ORDER — LIDOCAINE HYDROCHLORIDE 20 MG/ML
INJECTION, SOLUTION EPIDURAL; INFILTRATION; INTRACAUDAL; PERINEURAL AS NEEDED
Status: DISCONTINUED | OUTPATIENT
Start: 2021-04-20 | End: 2021-04-20 | Stop reason: HOSPADM

## 2021-04-20 RX ORDER — HYDROMORPHONE HYDROCHLORIDE 1 MG/ML
1 INJECTION, SOLUTION INTRAMUSCULAR; INTRAVENOUS; SUBCUTANEOUS
Status: DISCONTINUED | OUTPATIENT
Start: 2021-04-20 | End: 2021-04-21 | Stop reason: HOSPADM

## 2021-04-20 RX ORDER — SODIUM CHLORIDE 0.9 % (FLUSH) 0.9 %
5-40 SYRINGE (ML) INJECTION EVERY 8 HOURS
Status: DISCONTINUED | OUTPATIENT
Start: 2021-04-20 | End: 2021-04-21 | Stop reason: HOSPADM

## 2021-04-20 RX ORDER — BUPROPION HYDROCHLORIDE 150 MG/1
150 TABLET, EXTENDED RELEASE ORAL 2 TIMES DAILY
Status: DISCONTINUED | OUTPATIENT
Start: 2021-04-21 | End: 2021-04-21 | Stop reason: HOSPADM

## 2021-04-20 RX ORDER — MIDAZOLAM HYDROCHLORIDE 1 MG/ML
INJECTION, SOLUTION INTRAMUSCULAR; INTRAVENOUS AS NEEDED
Status: DISCONTINUED | OUTPATIENT
Start: 2021-04-20 | End: 2021-04-20 | Stop reason: HOSPADM

## 2021-04-20 RX ORDER — SODIUM CHLORIDE, SODIUM LACTATE, POTASSIUM CHLORIDE, CALCIUM CHLORIDE 600; 310; 30; 20 MG/100ML; MG/100ML; MG/100ML; MG/100ML
INJECTION, SOLUTION INTRAVENOUS
Status: DISCONTINUED | OUTPATIENT
Start: 2021-04-20 | End: 2021-04-20 | Stop reason: HOSPADM

## 2021-04-20 RX ORDER — OXYCODONE AND ACETAMINOPHEN 5; 325 MG/1; MG/1
1 TABLET ORAL AS NEEDED
Status: DISCONTINUED | OUTPATIENT
Start: 2021-04-20 | End: 2021-04-20 | Stop reason: HOSPADM

## 2021-04-20 RX ORDER — PROPOFOL 10 MG/ML
INJECTION, EMULSION INTRAVENOUS AS NEEDED
Status: DISCONTINUED | OUTPATIENT
Start: 2021-04-20 | End: 2021-04-20 | Stop reason: HOSPADM

## 2021-04-20 RX ORDER — LABETALOL HYDROCHLORIDE 5 MG/ML
5 INJECTION, SOLUTION INTRAVENOUS ONCE
Status: COMPLETED | OUTPATIENT
Start: 2021-04-20 | End: 2021-04-20

## 2021-04-20 RX ORDER — EPHEDRINE SULFATE/0.9% NACL/PF 50 MG/5 ML
SYRINGE (ML) INTRAVENOUS AS NEEDED
Status: DISCONTINUED | OUTPATIENT
Start: 2021-04-20 | End: 2021-04-20 | Stop reason: HOSPADM

## 2021-04-20 RX ORDER — FENTANYL CITRATE 50 UG/ML
50 INJECTION, SOLUTION INTRAMUSCULAR; INTRAVENOUS AS NEEDED
Status: DISCONTINUED | OUTPATIENT
Start: 2021-04-20 | End: 2021-04-20 | Stop reason: HOSPADM

## 2021-04-20 RX ORDER — ZOLPIDEM TARTRATE 5 MG/1
5 TABLET ORAL
Status: DISCONTINUED | OUTPATIENT
Start: 2021-04-20 | End: 2021-04-21 | Stop reason: HOSPADM

## 2021-04-20 RX ORDER — LABETALOL HYDROCHLORIDE 5 MG/ML
INJECTION, SOLUTION INTRAVENOUS
Status: DISPENSED
Start: 2021-04-20 | End: 2021-04-21

## 2021-04-20 RX ORDER — DEXAMETHASONE SODIUM PHOSPHATE 4 MG/ML
INJECTION, SOLUTION INTRA-ARTICULAR; INTRALESIONAL; INTRAMUSCULAR; INTRAVENOUS; SOFT TISSUE AS NEEDED
Status: DISCONTINUED | OUTPATIENT
Start: 2021-04-20 | End: 2021-04-20 | Stop reason: HOSPADM

## 2021-04-20 RX ORDER — KETAMINE HYDROCHLORIDE 10 MG/ML
INJECTION, SOLUTION INTRAMUSCULAR; INTRAVENOUS AS NEEDED
Status: DISCONTINUED | OUTPATIENT
Start: 2021-04-20 | End: 2021-04-20 | Stop reason: HOSPADM

## 2021-04-20 RX ORDER — MORPHINE SULFATE 2 MG/ML
2 INJECTION, SOLUTION INTRAMUSCULAR; INTRAVENOUS
Status: DISCONTINUED | OUTPATIENT
Start: 2021-04-20 | End: 2021-04-20 | Stop reason: HOSPADM

## 2021-04-20 RX ORDER — NALOXONE HYDROCHLORIDE 0.4 MG/ML
0.4 INJECTION, SOLUTION INTRAMUSCULAR; INTRAVENOUS; SUBCUTANEOUS AS NEEDED
Status: DISCONTINUED | OUTPATIENT
Start: 2021-04-20 | End: 2021-04-21 | Stop reason: HOSPADM

## 2021-04-20 RX ORDER — HYDROMORPHONE HYDROCHLORIDE 1 MG/ML
0.2 INJECTION, SOLUTION INTRAMUSCULAR; INTRAVENOUS; SUBCUTANEOUS
Status: DISCONTINUED | OUTPATIENT
Start: 2021-04-20 | End: 2021-04-20 | Stop reason: HOSPADM

## 2021-04-20 RX ORDER — LIDOCAINE HYDROCHLORIDE 10 MG/ML
0.1 INJECTION, SOLUTION EPIDURAL; INFILTRATION; INTRACAUDAL; PERINEURAL AS NEEDED
Status: DISCONTINUED | OUTPATIENT
Start: 2021-04-20 | End: 2021-04-20 | Stop reason: HOSPADM

## 2021-04-20 RX ORDER — SUCCINYLCHOLINE CHLORIDE 20 MG/ML
INJECTION INTRAMUSCULAR; INTRAVENOUS AS NEEDED
Status: DISCONTINUED | OUTPATIENT
Start: 2021-04-20 | End: 2021-04-20 | Stop reason: HOSPADM

## 2021-04-20 RX ADMIN — ROCURONIUM BROMIDE 30 MG: 10 SOLUTION INTRAVENOUS at 08:44

## 2021-04-20 RX ADMIN — MIDAZOLAM HYDROCHLORIDE 0.5 MG: 1 INJECTION, SOLUTION INTRAMUSCULAR; INTRAVENOUS at 13:10

## 2021-04-20 RX ADMIN — MIDAZOLAM HYDROCHLORIDE 0.5 MG: 1 INJECTION, SOLUTION INTRAMUSCULAR; INTRAVENOUS at 12:55

## 2021-04-20 RX ADMIN — DEXAMETHASONE SODIUM PHOSPHATE 8 MG: 4 INJECTION, SOLUTION INTRAMUSCULAR; INTRAVENOUS at 09:04

## 2021-04-20 RX ADMIN — FENTANYL CITRATE 25 MCG: 50 INJECTION, SOLUTION INTRAMUSCULAR; INTRAVENOUS at 12:50

## 2021-04-20 RX ADMIN — POTASSIUM CHLORIDE, DEXTROSE MONOHYDRATE AND SODIUM CHLORIDE 150 ML/HR: 150; 5; 450 INJECTION, SOLUTION INTRAVENOUS at 12:40

## 2021-04-20 RX ADMIN — KETOROLAC TROMETHAMINE 30 MG: 30 INJECTION, SOLUTION INTRAMUSCULAR; INTRAVENOUS at 21:04

## 2021-04-20 RX ADMIN — FENTANYL CITRATE 50 MCG: 50 INJECTION, SOLUTION INTRAMUSCULAR; INTRAVENOUS at 09:15

## 2021-04-20 RX ADMIN — POTASSIUM CHLORIDE, DEXTROSE MONOHYDRATE AND SODIUM CHLORIDE 150 ML/HR: 150; 5; 450 INJECTION, SOLUTION INTRAVENOUS at 22:26

## 2021-04-20 RX ADMIN — NEOSTIGMINE METHYLSULFATE 3 MG: 1 INJECTION, SOLUTION INTRAVENOUS at 11:12

## 2021-04-20 RX ADMIN — SUCCINYLCHOLINE CHLORIDE 160 MG: 20 INJECTION, SOLUTION INTRAMUSCULAR; INTRAVENOUS at 08:41

## 2021-04-20 RX ADMIN — FENTANYL CITRATE 100 MCG: 50 INJECTION, SOLUTION INTRAMUSCULAR; INTRAVENOUS at 08:33

## 2021-04-20 RX ADMIN — ROCURONIUM BROMIDE 10 MG: 10 SOLUTION INTRAVENOUS at 09:13

## 2021-04-20 RX ADMIN — KETAMINE HYDROCHLORIDE 50 MG: 10 INJECTION, SOLUTION INTRAMUSCULAR; INTRAVENOUS at 08:41

## 2021-04-20 RX ADMIN — FENTANYL CITRATE 50 MCG: 50 INJECTION, SOLUTION INTRAMUSCULAR; INTRAVENOUS at 11:05

## 2021-04-20 RX ADMIN — SODIUM CHLORIDE, POTASSIUM CHLORIDE, SODIUM LACTATE AND CALCIUM CHLORIDE: 600; 310; 30; 20 INJECTION, SOLUTION INTRAVENOUS at 08:44

## 2021-04-20 RX ADMIN — PROPOFOL 50 MG: 10 INJECTION, EMULSION INTRAVENOUS at 09:57

## 2021-04-20 RX ADMIN — SODIUM CHLORIDE, POTASSIUM CHLORIDE, SODIUM LACTATE AND CALCIUM CHLORIDE: 600; 310; 30; 20 INJECTION, SOLUTION INTRAVENOUS at 11:20

## 2021-04-20 RX ADMIN — PROPOFOL 100 MG: 10 INJECTION, EMULSION INTRAVENOUS at 11:04

## 2021-04-20 RX ADMIN — ONDANSETRON HYDROCHLORIDE 4 MG: 2 INJECTION, SOLUTION INTRAMUSCULAR; INTRAVENOUS at 10:48

## 2021-04-20 RX ADMIN — Medication 10 MG: at 09:49

## 2021-04-20 RX ADMIN — GLYCOPYRROLATE 0.4 MG: 0.2 INJECTION, SOLUTION INTRAMUSCULAR; INTRAVENOUS at 11:12

## 2021-04-20 RX ADMIN — MIDAZOLAM HYDROCHLORIDE 0.5 MG: 1 INJECTION, SOLUTION INTRAMUSCULAR; INTRAVENOUS at 13:05

## 2021-04-20 RX ADMIN — WATER 2 G: 1 INJECTION INTRAMUSCULAR; INTRAVENOUS; SUBCUTANEOUS at 08:51

## 2021-04-20 RX ADMIN — PROPOFOL 150 MG: 10 INJECTION, EMULSION INTRAVENOUS at 08:41

## 2021-04-20 RX ADMIN — ROCURONIUM BROMIDE 10 MG: 10 SOLUTION INTRAVENOUS at 10:28

## 2021-04-20 RX ADMIN — LABETALOL HYDROCHLORIDE 5 MG: 5 INJECTION INTRAVENOUS at 13:39

## 2021-04-20 RX ADMIN — LIDOCAINE HYDROCHLORIDE 100 MG: 20 INJECTION, SOLUTION EPIDURAL; INFILTRATION; INTRACAUDAL; PERINEURAL at 08:41

## 2021-04-20 RX ADMIN — SODIUM CHLORIDE, POTASSIUM CHLORIDE, SODIUM LACTATE AND CALCIUM CHLORIDE 500 ML: 600; 310; 30; 20 INJECTION, SOLUTION INTRAVENOUS at 11:50

## 2021-04-20 RX ADMIN — MIDAZOLAM 2 MG: 1 INJECTION INTRAMUSCULAR; INTRAVENOUS at 08:33

## 2021-04-20 RX ADMIN — SODIUM CHLORIDE, POTASSIUM CHLORIDE, SODIUM LACTATE AND CALCIUM CHLORIDE: 600; 310; 30; 20 INJECTION, SOLUTION INTRAVENOUS at 08:25

## 2021-04-20 RX ADMIN — HYDROMORPHONE HYDROCHLORIDE 0.5 MG: 2 INJECTION, SOLUTION INTRAMUSCULAR; INTRAVENOUS; SUBCUTANEOUS at 10:18

## 2021-04-20 RX ADMIN — FENTANYL CITRATE 25 MCG: 50 INJECTION, SOLUTION INTRAMUSCULAR; INTRAVENOUS at 13:50

## 2021-04-20 RX ADMIN — MIDAZOLAM HYDROCHLORIDE 0.5 MG: 1 INJECTION, SOLUTION INTRAMUSCULAR; INTRAVENOUS at 12:50

## 2021-04-20 RX ADMIN — Medication 10 MG: at 09:28

## 2021-04-20 RX ADMIN — ROCURONIUM BROMIDE 10 MG: 10 SOLUTION INTRAVENOUS at 08:41

## 2021-04-20 RX ADMIN — FENTANYL CITRATE 25 MCG: 50 INJECTION, SOLUTION INTRAMUSCULAR; INTRAVENOUS at 12:45

## 2021-04-20 RX ADMIN — ROCURONIUM BROMIDE 10 MG: 10 SOLUTION INTRAVENOUS at 09:57

## 2021-04-20 NOTE — ANESTHESIA PREPROCEDURE EVALUATION
Relevant Problems   No relevant active problems       Anesthetic History   No history of anesthetic complications            Review of Systems / Medical History  Patient summary reviewed, nursing notes reviewed and pertinent labs reviewed    Pulmonary  Within defined limits                 Neuro/Psych         Psychiatric history     Cardiovascular            Dysrhythmias : atrial flutter      Exercise tolerance: >4 METS     GI/Hepatic/Renal     GERD: well controlled           Endo/Other        Arthritis and cancer     Other Findings              Physical Exam    Airway  Mallampati: II  TM Distance: > 6 cm  Neck ROM: normal range of motion   Mouth opening: Normal     Cardiovascular  Regular rate and rhythm,  S1 and S2 normal,  no murmur, click, rub, or gallop  Rhythm: regular  Rate: normal         Dental    Dentition: Implants and Caps/crowns     Pulmonary  Breath sounds clear to auscultation               Abdominal  GI exam deferred       Other Findings            Anesthetic Plan    ASA: 2  Anesthesia type: general          Induction: Intravenous  Anesthetic plan and risks discussed with: Patient

## 2021-04-20 NOTE — PERIOP NOTES
Patient: Marjan Muhammad MRN: [id number]  SSN: [id number]   YOB: 1955  Age: 60 y.o. Sex: male     Patient is status post Procedure(s):  DAVINCI RADICAL ROBOTIC ASSISTED LAPAROSCOPIC PROSTATECTOMY, LEFT  PELVIC LYMPH NODE DISSECTION.     Surgeon(s) and Role:     * Sierra Higgins MD - Primary    Local/Dose/Irrigation:  0.5% BUPIVACAINE AND B&O SUPP                  Peripheral IV 04/20/21 Posterior;Right Hand (Active)   Site Assessment Clean, dry, & intact 04/20/21 0837   Phlebitis Assessment 0 04/20/21 0837   Infiltration Assessment 0 04/20/21 0837   Dressing Status Clean, dry, & intact 04/20/21 0837   Dressing Type Tape;Transparent 04/20/21 0837   Hub Color/Line Status Green 04/20/21 0837       Peripheral IV 04/20/21 Left Wrist (Active)                           Dressing/Packing:  Incision 04/20/21 Abdomen-Dressing/Treatment: Skin glue (04/20/21 1112)    Splint/Cast:  ]    Other:

## 2021-04-20 NOTE — PERIOP NOTES
TRANSFER - OUT REPORT:    Verbal report given to Jacques kaiden Shannon  being transferred to room 512 for routine post - op       Report consisted of patients Situation, Background, Assessment and   Recommendations(SBAR). Time Pre op antibiotic given:2 gram ancef  Anesthesia Stop time: 8010  Avendano Present on Transfer to floor:yes  Order for Avendano on Chart:yes  Discharge Prescriptions with Chart:    Information from the following report(s) SBAR, OR Summary, Intake/Output and MAR was reviewed with the receiving nurse. Opportunity for questions and clarification was provided. Is the patient on 02? YES       L/Min 2       Other     Is the patient on a monitor? NO    Is the nurse transporting with the patient? NO    Surgical Waiting Area notified of patient's transfer from PACU? YES      The following personal items collected during your admission accompanied patient upon transfer:   Dental Appliance:    Vision:    Hearing Aid:    Jewelry: Jewelry: None  Clothing: Clothing: (clothing to [employer])  Other Valuables:  Other Valuables: Eyeglasses  Valuables sent to safe:

## 2021-04-20 NOTE — H&P
UROLOGY HISTORY AND PHYSICAL    Patient: Dominic Arenas MRN: [id number]  SSN: [id number]    YOB: 1955  Age: 60 y.o. Sex: male          Date of Encounter:  April 20, 2021  Pre-existing [facility] Patient:            Assessment/Plan:  Prostate cancer. Robotic radical prostatectomy. History of Present Illness:  Patient is a 60 y.o. male. He presents with prostate cancer. Past Medical History:  No Known Allergies   Prior to Admission medications    Medication Sig Start Date End Date Taking? Authorizing Provider   minocycline (MINOCIN, DYNACIN) 50 mg capsule Take 50 mg by mouth daily. Provider, Historical   buPROPion XL (WELLBUTRIN XL) 300 mg XL tablet Take 300 mg by mouth every morning. Provider, Historical   citalopram (CELEXA) 40 mg tablet Take 40 mg by mouth daily. Provider, Historical   esomeprazole (NexIUM) 20 mg capsule Take 20 mg by mouth daily. Provider, Historical     PMHx:  has a past medical history of Arrhythmia (2015), Arthritis, Cancer ([facility] [address] (2021), GERD (gastroesophageal reflux disease), H/O motion sickness, and Psychiatric disorder. PSurgHx:  has a past surgical history that includes hx tonsillectomy; hx endoscopy; hx colonoscopy; hx cholecystectomy; hx gi; hx orthopaedic (Left); hx orthopaedic (Left); hx orthopaedic (Right); and hx orthopaedic (Right). PSocHx:  reports that he has never smoked. He has never used smokeless tobacco. He reports current alcohol use. He reports previous drug use. ROS:  negative other than above.     Physical Exam:            General:    appears nontoxic                     Skin:  no clubbing, cyanosis, edema                HEENT:  NCAT, O/P Clear        Throat/Neck:  supple, no LAD                 Chest[de-identified]  CTA      Heart[de-identified]  Regular rate and rhythm             Abdomen/Flank[de-identified]  No CVAT, non-tender soft abdomen, no masses             Bladder[de-identified]  Bladder not palpable     Lymph nodes:  no Cervical, supraclavicular, and axillary LAD     Lab Results   Component Value Date/Time    WBC 5.1 04/13/2021 12:26 PM    HCT 45.2 04/13/2021 12:26 PM    PLATELET 053 98/99/6716 12:26 PM    Sodium 138 04/13/2021 12:26 PM    Potassium 4.2 04/13/2021 12:26 PM    Chloride 105 04/13/2021 12:26 PM    CO2 26 04/13/2021 12:26 PM    BUN 13 04/13/2021 12:26 PM    Creatinine 1.05 04/13/2021 12:26 PM    Glucose 109 (H) 04/13/2021 12:26 PM    Calcium 9.1 04/13/2021 12:26 PM    INR 1.0 04/13/2021 12:26 PM       UA:   Lab Results   Component Value Date/Time    Color YELLOW/STRAW 04/13/2021 12:26 PM    Appearance CLEAR 04/13/2021 12:26 PM    Specific gravity 1.011 04/13/2021 12:26 PM    pH (UA) 5.0 04/13/2021 12:26 PM    Protein Negative 04/13/2021 12:26 PM    Glucose Negative 04/13/2021 12:26 PM    Ketone Negative 04/13/2021 12:26 PM    Bilirubin Negative 04/13/2021 12:26 PM    Urobilinogen 0.2 04/13/2021 12:26 PM    Nitrites Negative 04/13/2021 12:26 PM    Leukocyte Esterase Negative 04/13/2021 12:26 PM    Epithelial cells FEW 04/13/2021 12:26 PM    Bacteria Negative 04/13/2021 12:26 PM    WBC 0-4 04/13/2021 12:26 PM    RBC 0-5 04/13/2021 12:26 PM       Cultures:   Xrays:     Signed By: Daniel Sutherland MD  - April 20, 2021

## 2021-04-20 NOTE — OP NOTES
OPERATIVE REPORT      PREOPERATIVE DIAGNOSIS: Adenocarcinoma of the prostate. POSTOPERATIVE DIAGNOSIS: Adenocarcinoma of the prostate. OPERATIVE PROCEDURE  DaVinci robotic-assisted laparoscopic radical prostatectomy. Bilateral Nerve sparing    Left Lymph node dissection    Repair of midline trigone cystotomy    SURGEON: Gabi Parsons MD    ASSISTANT: Nursing staff    ANESTHESIA: General endotracheal.    COMPLICATIONS: None. EBL: 100cc    INDICATIONS: T1c G6 PCA    DESCRIPTION OF PROCEDURE: After informed consent was obtained, the patient  was given appropriate IV antibiotic prophylaxis in the holding area. The  patient was then brought to the operative theatre, where he received  general anesthesia. The patient was carefully placed in a low lithotomy,  Trendelenburg position. All pressure points were padded appropriately. The patient's abdomen and genitalia were shaved, prepared and draped in the  usual sterile fashion. A Avendano catheter was introduced into the bladder  transurethrally. A supraumbilical incision was made with a #15 scalpel blade. Veress needle placed into the peritoneal cavity and position confirmed with saline irrigation. Veress needle connected to C02 gas to generate a pneumoperitoneum of 15mm H20 pressure,  followed by introduction of the camera port and camera through this incision site. The  remaining ports were placed under camera vision. Two robotic arm ports were  placed, flanking the umbilicus at the lateral border of the rectus  abdominus muscle. The 4th arm robotic port was placed in the right lower  abdominal quadrant and the accessory port was placed in the left lower  abdominal quadrant. The robot was then docked and the procedure ensued at  the surgical console. The bladder was released from the anterior pelvis by transection of the  medial umbilical ligaments and urachal remnant.  The space of Retzius was  defined and anterior prostatic fat was dissected off and removed. The  endopelvic fascia was sharply incised from the prostatic base to the apex. Pubo-prostatic ligaments were taken down sharply as well. The dorsal venous  complex was doubly ligated with interrupted 1-0 Vicryl suture. The bladder  neck was identified and subsequently transected anteriorly to expose the  Avendaon catheter. The Avendano catheter was retracted to complete the bladder  neck transaction posteriorly. This dissection continued posteriorly to the  level of the vas ampullae and seminal vesicles. These were dissected out  and lifted anteriorly. Posterior Denonvilliers fascia was incised  transversely to begin the dissection of the posterior aspect of the  prostate off the rectum and pre-rectal fat. The vascular pedicles of the  prostate were transected with selective arterial pinpoint cautery hemostasis. The lateral prostatic fascia was incised from the prostatic base to the apex bilaterally to begin  the delicate nerve-sparing portion of the procedure. Athermic and  atraumatic dissection was carefully performed to release both cavernous  neurovascular bundles from the posterior lateral aspect of the prostate. The dorsal venous complex was transected just beyond the apex of the  prostate. The urethra was then transected and the prostate specimen was  placed into a retrieval bag and positioned in the left paracolic gutter for  later extraction. The pelvis was then copiously irrigated with saline and  inspected for significant bleeding or injury. None was seen. The  vesico-urethral anastomosis was performed with double-armed 3-0 v-lock suture in  a running fashion. A new Avendano catheter was anchored transurethrally. The robot was de-docked and all trocars were removed  under vision to confirm hemostasis. The supraumbilical incision was  lengthened to remove the specimen. The abdominal fascia in this area was  approximated with 1-0 PDS in a figure-of-eight fashion.  All incision sites  were injected with 0.25% Marcaine. Subcuticular closure was performed with  3-0 Monocryl and skin approximation with Dermabond. The patient was  repositioned supine and awakened, extubated and transferred to the recovery  room in good condition. Addendum:    1. Anterior approach used. 2. Endopelvic fascia incised. 3. No median lobe identified on bladder neck transection. 4. Watertight vesicourethral anastomosis confirmed. 5. Benign appearing lymph nodes noted in left pelvis dissected out and submitted with prostate. 6. Repair of midline trigone cystotomy with v lock suture.

## 2021-04-20 NOTE — PERIOP NOTES
PATIENT INTERVIEWED IN PREOP. NAME BAND VISIBLE AND CORRECT PER PATIENT. PATIENT HAS UNDERSTANDING OF PROCEDURE AND SURGICAL SITE. EDUCATIONAL NEEDS MET. PATIENT STATES RIGHT KNEE PAIN AT 5.

## 2021-04-20 NOTE — ANESTHESIA POSTPROCEDURE EVALUATION
Procedure(s):  DAVINCI RADICAL ROBOTIC ASSISTED LAPAROSCOPIC PROSTATECTOMY, LEFT  PELVIC LYMPH NODE DISSECTION. general    Anesthesia Post Evaluation      Multimodal analgesia: multimodal analgesia used between 6 hours prior to anesthesia start to PACU discharge  Patient location during evaluation: PACU  Patient participation: complete - patient participated  Level of consciousness: awake and alert  Airway patency: patent  Anesthetic complications: no  Cardiovascular status: acceptable  Respiratory status: acceptable  Hydration status: acceptable  Comments: Seen, no complaints    Final Post Anesthesia Temperature Assessment:  Normothermia (36.0-37.5 degrees C)      INITIAL Post-op Vital signs:   Vitals Value Taken Time   /97 04/20/21 1315   Temp 36.7 °C (98.1 °F) 04/20/21 1220   Pulse 85 04/20/21 1318   Resp 9 04/20/21 1318   SpO2 96 % 04/20/21 1318   Vitals shown include unvalidated device data.

## 2021-04-20 NOTE — DISCHARGE INSTRUCTIONS
Dr. Amelie Henderson. Nilesh Edu Litter PROSTATECTOMY  POST OPERATIVE INSTRUCTIONS    FOLLOW-UP VISIT    ? Dr. Johnny Barrientos or his associate will see you back in the office in 1 week. ? At that time your final pathology report will be reviewed with you.  ? Your Avendano catheter will be evaluated for removal at that time. ? You will be re-educated on male kegel exercises to strengthen your pelvic muscles. This exercise is felt to hasten your recovery of urinary continence. Virtually everyone has leakage of urine upon removal of the catheter and recovery time is variable and everyone is different. Please be patient. ? Please bring an adult urinary pad (such as Depend Guards) with you on this appointment. DISCHARGE MEDICATIONS    ? Upon discharge you will be given prescriptions for pain control (Hydrocodone)   ? Most patients experience only minimal discomfort after this minimally invasive surgery. We recommend using Tylenol (acetaminophen) for pain first and reserve the narcotic pain medication as an alternative as it tends to cause constipation. ? You may resume your normal medications upon discharge from the hospital with the exception of any blood thinning products (such as coumadin or aspirin). ACTIVITY    ? Please refrain from driving for the 1st week after your surgery. ? You may shower upon discharge from the hospital. Avoid bathtubs, hot tubs or swimming pools during 1st 2 weeks. ? It is important to be mobile during your recovery period. Avoid sitting in one position for longer than 45 minutes to 1 hour. Walking and climbing stairs are OK. Be careful not to overdo it. ? Avoid any heavy lifting or strenuous activity for the first 2 weeks. ? Most patients ease into normal activities (including employment) after 2 weeks of recuperation. ? Most patients resume driving by the 2nd week. Please use your best judgment. CATHETER CARE    ?  Keep your [facility] urinary catheter below the level of your bladder at all times otherwise it may not drain properly. ? The leg bag can be fitted under your trousers for daytime use. The larger overnight bag will hold more urine and is best reserved for bedtime use.  ? Minimal care of this unit is required. Make sure there is slack on the catheter between your penis and the drainage bag. This should not be on any tension. Empty the bag when full. You may disconnect the urinary drainage bag when showering and let the catheter drain freely. ? A topical antibiotic ointment applied to the catheter as inserts into the penis will reduce discomfort from the catheter. This can be obtained over the counter at your local pharmacy. ? Do not perform the male kegel exercises while the urinary catheter is in place. CARE OF YOUR INCISION SITES    ? Application of ointments or creams to the incision sites is not recommended. ? The adhesive clear wound dressing will slough off naturally in 5 - 10 days  ? Do not pick at or apply ointments/medications to the adhesive dressing  ? Do not scrub, soak or expose incisions to prolonged moisture. MALE KEGEL EXERCISES    ? Once your [facility] urinary catheter is removed it will be safe to start these exercises. ? Your surgery removed your prostate and affected your secondary urinary control mechanisms. Your external sphincter must now take all the responsibility for keeping you dry and continent. It will take time and effort to strengthen this mechanism. How do you do Kegel exercises? The first step is to find the right muscles. Imagine that you are trying to stop yourself from passing gas. Squeeze the muscles you would use. If you sense a \"pulling\" feeling, those are the right muscles for pelvic exercises. It is important not to squeeze other muscles at the same time and not to hold your breath. Also, be careful not to tighten your stomach, leg, or buttock muscles.  Squeezing the wrong muscles can put more pressure on your bladder control muscles. Squeeze just the pelvic muscles. Repeat, but do not overdo it. Pull in the pelvic muscles and hold for a count of 3. Then relax for a count of 3. Work up to [employer] of 10 repeats. Be patient. Do not give up. It takes just 5 minutes, three to five times a day. Your bladder control may not improve for 3 to 6 weeks, although most people notice an improvement after a few weeks. DIET     Please avoid carbonated beverages and any other gas producing foods (such as flour, beans, or broccoli) for the 1st week or so. Small meals are best until bowel habits return to normal.    THINGS YOU MAY ENCOUNTER AFTER SURGERY    ? Bruising around incision sites. Not at all uncommon and should not alarm you. This will resolve with time. ? Abdominal distention, constipation or bloating. Make sure you are following the dietary instructions. If you dont have a bowel movement or pass gas or are feeling uncomfortable 24 hours after surgery, try taking Milk of Magnesia as directed on the bottle. If after two doses of Milk of Magnesia, you still have not had a bowel movement, it is safe to use a Dulcolax suppository (available over the counter at your local pharmacy). ? Scrotal/Penile swelling and bruising. This is quite common and should not alarm you. It may appear immediately after surgery or may start 4 -5 days after surgery. It should resolve in about 7 - 14 days. You may try elevating your scrotum with a small towel or washcloth when lying down. ? Bloody drainage around leary catheter or in the urine. This is especially common after increased activity or following a bowel movement. Do not be alarmed. Resting for a short period and drinking plenty of fluids usually improves the situation. ? Leaking urine around Leary catheter. This is not unusual.  The catheter is not perfect, but most of the urine should flow through the catheter into the drainage bag.  ? Bladder spasms. It is not uncommon with the catheter in and even after the catheter comes out to have bladder spasms. You may feel mild to severe bladder pain or cramping. You may also experience the sudden urge to urinate or a burning sensation when you urinate. Call your MD if this persists without relief. ? Perineal pain (pain between your rectum and scrotum)/Testicular discomfort. This may last for several weeks after surgery, but it will resolve. Call your MD if the pain medication does not alleviate this. ? Lower legs/ankle swelling. This is not abnormal with it occurs in both legs and should not alarm you. It should resolve in about 7 - 14 days. Elevation of your legs while sitting will help. CONTACT MD IMMEDIATELY IF YOU ARE EXPERIENCING ANY OF THE FOLLOWING SYMPTOMS:    ? Oral Temperature over 101° F.  ? Urine stops draining from Avendano into drainage bag.  ? Any pain not relieved by pain medication prescribed. ? Nausea/Vomiting. ? Large amount of blood clots in urine that are blocking the catheter from draining. ? Bladder spasms that are not relieved with pain medication. ? Uneven swelling of legs or ankles. ? Redness and/or large amount of swelling around your incision sites. ? Excessive bleeding or drainage from your incision sites.         [address]  [phone]

## 2021-04-20 NOTE — BRIEF OP NOTE
Brief Postoperative Note    Patient: Gilda Mena  YOB: 1955  MRN: [id number]    Date of Procedure: 4/20/2021     Pre-Op Diagnosis: PROSTATE CANCER    Post-Op Diagnosis: Same as preoperative diagnosis.       Procedure(s):  DAVINCI RADICAL ROBOTIC ASSISTED LAPAROSCOPIC PROSTATECTOMY, POSSIBLE OPEN, POSSIBLE PELVIC LYMPH NODE DISSECTION    Surgeon(s):  Getachew Hare MD    Surgical Assistant: Surg Asst-1: Irina Hazel    Anesthesia: General     Estimated Blood Loss (mL): less than 697     Complications: None    Specimens:   ID Type Source Tests Collected by Time Destination   1 : LEFT PELVIC LYMPH NODE Fresh Lymph Node  Getachew Hare MD 4/20/2021 2210 Pathology   2 : Prostate Fresh Prostate  Getachew Hare MD 4/20/2021 1106 Pathology        Implants: * No implants in log *    Drains: * No LDAs found *    Findings: PCA    Electronically Signed by Shelby Wills MD on 4/20/2021 at 11:17 AM

## 2021-04-21 VITALS
TEMPERATURE: 98.4 F | RESPIRATION RATE: 17 BRPM | BODY MASS INDEX: 34.35 KG/M2 | HEIGHT: 69 IN | DIASTOLIC BLOOD PRESSURE: 71 MMHG | OXYGEN SATURATION: 94 % | HEART RATE: 82 BPM | WEIGHT: 231.92 LBS | SYSTOLIC BLOOD PRESSURE: 116 MMHG

## 2021-04-21 PROCEDURE — 74011250637 HC RX REV CODE- 250/637: Performed by: UROLOGY

## 2021-04-21 PROCEDURE — 99218 HC RM OBSERVATION: CPT

## 2021-04-21 PROCEDURE — 77030012865 HC BG URIN LEG MDII -A

## 2021-04-21 PROCEDURE — 74011250636 HC RX REV CODE- 250/636: Performed by: UROLOGY

## 2021-04-21 PROCEDURE — 77030040831 HC BAG URINE DRNG MDII -A

## 2021-04-21 RX ADMIN — KETOROLAC TROMETHAMINE 30 MG: 30 INJECTION, SOLUTION INTRAMUSCULAR; INTRAVENOUS at 07:14

## 2021-04-21 RX ADMIN — PANTOPRAZOLE SODIUM 40 MG: 40 TABLET, DELAYED RELEASE ORAL at 07:14

## 2021-04-21 RX ADMIN — KETOROLAC TROMETHAMINE 30 MG: 30 INJECTION, SOLUTION INTRAMUSCULAR; INTRAVENOUS at 01:15

## 2021-04-21 RX ADMIN — Medication 10 ML: at 07:15

## 2021-04-21 RX ADMIN — POTASSIUM CHLORIDE, DEXTROSE MONOHYDRATE AND SODIUM CHLORIDE 150 ML/HR: 150; 5; 450 INJECTION, SOLUTION INTRAVENOUS at 05:26

## 2021-04-21 RX ADMIN — BUPROPION HYDROCHLORIDE 150 MG: 150 TABLET, EXTENDED RELEASE ORAL at 09:19

## 2021-04-21 RX ADMIN — CITALOPRAM HYDROBROMIDE 20 MG: 20 TABLET ORAL at 09:19

## 2021-04-21 NOTE — PROGRESS NOTES
TRANSFER - IN REPORT:    Verbal report received from Rhonda(name) on Marjan Muhammad  being received from [facility]) for routine post - op      Report consisted of patients Situation, Background, Assessment and   Recommendations(SBAR). Information from the following report(s) SBAR and OR Summary was reviewed with the receiving nurse. Opportunity for questions and clarification was provided. Assessment completed upon patients arrival to unit and care assumed.

## 2021-04-21 NOTE — PROGRESS NOTES
I have reviewed discharge instructions with the patient and spouse. The patient and spouse verbalized understanding. Discharge medications reviewed with patient and spouse and appropriate educational materials and side effects teaching were provided. Avendano catheter care education was provided to the patient and the spouse. The IV lines were removed. Gave the patient and spouse opportunity to ask questions at the end.

## 2021-04-21 NOTE — ROUTINE PROCESS
Attended Phase II Cardiac Rehab. No medication or health history changes reported. See [facility] for details.  
Bedside shift change report given to Tahir Lemus (oncoming nurse) by Girish Scott RN (offgoing nurse). Report included the following information SBAR, Kardex, Intake/Output and MAR.
I have reviewed and agree with the charting of Phoebe Shock.
no arthralgia/no joint swelling/no muscle weakness

## 2021-04-21 NOTE — DISCHARGE SUMMARY
Urology Discharge Summary    Patient: Yonatan Honeycutt MRN: [id number]  SSN: [id number]    YOB: 1955  Age: 60 y.o. Sex: male               ADMISSION:  to Madeline Thomas MD by Thao Bowden MD  4/20/2021 ADMISSION DIAGNOSIS: Prostate cancer [facility]) Sharmin Molina  Prostate CA ([facility] Sharmin Molina  4/21/2021 DISCHARGE DIAGNOSIS:  Same  CONSULTS: None  PROCEDURES: POD# 1 Day Post-Op Procedure(s):  DAVINCI RADICAL ROBOTIC ASSISTED LAPAROSCOPIC PROSTATECTOMY, LEFT  PELVIC LYMPH NODE DISSECTION    RECENT LABS:  CBC:   Lab Results   Component Value Date/Time    HGB 15.0 04/13/2021 12:26 PM    HCT 45.2 04/13/2021 12:26 PM       BMP:   Lab Results   Component Value Date/Time    BUN 13 04/13/2021 12:26 PM    Creatinine 1.05 04/13/2021 12:26 PM          HOSPITAL COURSE: Uncomplicated. Condition on Discharge  Good  COMPLICATIONS: None  DISCHARGE TO:     Home  FOLLOWUP: one week        Current Discharge Medication List      START taking these medications    Details   HYDROcodone-acetaminophen (NORCO) 5-325 mg per tablet Take 1 Tab by mouth every four (4) hours as needed for Pain for up to 2 days. Max Daily Amount: 6 Tabs. Qty: 10 Tab, Refills: 0    Associated Diagnoses: Prostate cancer ([facility]         CONTINUE these medications which have NOT CHANGED    Details   minocycline (MINOCIN, DYNACIN) 50 mg capsule Take 50 mg by mouth daily. buPROPion XL (WELLBUTRIN XL) 300 mg XL tablet Take 300 mg by mouth every morning. citalopram (CELEXA) 40 mg tablet Take 40 mg by mouth daily. esomeprazole (NexIUM) 20 mg capsule Take 20 mg by mouth daily.                Thao Bowden MD 4/21/2021 1:14 PM

## 2021-04-21 NOTE — PROGRESS NOTES
Urology Progress Note    Admit Date:  4/20/2021    Admit Dx: Prostate cancer [facility]) [C61]  Prostate CA [facility]) [address]        SUBJECTIVE:     Felipa Hernández is ambulatory.         OBJECTIVE:     Vitals:  Patient Vitals for the past 8 hrs:   BP Temp Pulse Resp SpO2   04/21/21 0822 116/71 98.4 °F (36.9 °C) 82 17 94 %   04/21/21 0515 104/60 98.2 °F (36.8 °C) 80 18 95 %       Intake and Output:     Last shift  04/19 1901 - 04/21 0700  In: 3514 [I.V.:3450]  Out: 1875 [MMQSK:3592]     Last 8 hours  04/21 0701 - 04/21 1900  In: -   Out: 700 [Urine:700]    Drain Output (last 8hr):         Physical Exam: Abdomen Soft ND    Labs:  CBC:   Lab Results   Component Value Date/Time    HGB 15.0 04/13/2021 12:26 PM    HCT 45.2 04/13/2021 12:26 PM     BMP:   Lab Results   Component Value Date/Time    BUN 13 04/13/2021 12:26 PM    Creatinine 1.05 04/13/2021 12:26 PM             Assessment:     Procedure(s):  DAVINCI RADICAL ROBOTIC ASSISTED LAPAROSCOPIC PROSTATECTOMY, LEFT  PELVIC LYMPH NODE DISSECTION    Doing well    Plan:     Home today              Signed By: Lawyer Sherwin MD - April 21, 2021

## 2021-08-03 PROBLEM — C61 PROSTATE CANCER (HCC): Status: RESOLVED | Noted: 2021-04-20 | Resolved: 2021-08-03

## 2021-12-02 ENCOUNTER — OFFICE VISIT (OUTPATIENT)
Dept: ORTHOPEDIC SURGERY | Age: 66
End: 2021-12-02
Payer: MEDICARE

## 2021-12-02 VITALS — BODY MASS INDEX: 34.07 KG/M2 | WEIGHT: 230 LBS | HEIGHT: 69 IN

## 2021-12-02 DIAGNOSIS — M25.561 CHRONIC PAIN OF RIGHT KNEE: ICD-10-CM

## 2021-12-02 DIAGNOSIS — M17.11 PRIMARY OSTEOARTHRITIS OF RIGHT KNEE: Primary | ICD-10-CM

## 2021-12-02 DIAGNOSIS — M25.551 RIGHT HIP PAIN: ICD-10-CM

## 2021-12-02 DIAGNOSIS — G89.29 CHRONIC PAIN OF RIGHT KNEE: ICD-10-CM

## 2021-12-02 PROCEDURE — 3017F COLORECTAL CA SCREEN DOC REV: CPT | Performed by: ORTHOPAEDIC SURGERY

## 2021-12-02 PROCEDURE — G8427 DOCREV CUR MEDS BY ELIG CLIN: HCPCS | Performed by: ORTHOPAEDIC SURGERY

## 2021-12-02 PROCEDURE — G8536 NO DOC ELDER MAL SCRN: HCPCS | Performed by: ORTHOPAEDIC SURGERY

## 2021-12-02 PROCEDURE — G8417 CALC BMI ABV UP PARAM F/U: HCPCS | Performed by: ORTHOPAEDIC SURGERY

## 2021-12-02 PROCEDURE — 20610 DRAIN/INJ JOINT/BURSA W/O US: CPT | Performed by: ORTHOPAEDIC SURGERY

## 2021-12-02 PROCEDURE — G8432 DEP SCR NOT DOC, RNG: HCPCS | Performed by: ORTHOPAEDIC SURGERY

## 2021-12-02 PROCEDURE — 99213 OFFICE O/P EST LOW 20 MIN: CPT | Performed by: ORTHOPAEDIC SURGERY

## 2021-12-02 PROCEDURE — 1101F PT FALLS ASSESS-DOCD LE1/YR: CPT | Performed by: ORTHOPAEDIC SURGERY

## 2021-12-02 NOTE — PROGRESS NOTES
Bob Appiah (: 1955) is a 73 y.o. male, patient, here for evaluation of the following chief complaint(s):  Knee Pain (right knee)       SUBJECTIVE/OBJECTIVE:  Bob Appiah presents today complaining of persistent right knee pain. We saw him in January of this year. He had corticosteroid injection at that time for patellofemoral arthritis. Injection did not really help much, then immediately after prostate surgery in the spring he had near complete relief of his knee symptoms. They have gradually returned. Symptoms are a little bit inconsistent. Overall worst symptoms seem to be anteromedial.  Has some occasional lateral joint line pain that radiates a little bit distal.  Always activity related. Gelling symptoms, pain at start up. Usually does not have wakening night pain. Does not specifically have difficulty with stairs. Occasional catching and giving way. He has been taking prescription anti-inflammatories on and off. Incidentally, he relates some mild to moderate intermittent activity related pain in the anterior aspect of both hips. Generally does not have rest pain. The hips do not limit his activities. PHYSICAL EXAM:  Vitals: Ht 5' 9\" (1.753 m)   Wt 230 lb (104.3 kg)   BMI 33.97 kg/m²   Body mass index is 33.97 kg/m². 73y.o. year old M, no distress. Ambulates without a limp. Pain-free motion lumbar spine. No nerve tension signs. Negative Stinchfield both hips. Range of motion is symmetrical, slightly reduced. Flexion 100, internal rotation just past neutral, external rotation approximately 35-40. Has some mild groin discomfort on the right at extremes of rotation. Right knee neutral alignment. Healed arthroscopy scars. No effusion. Tender anteromedially. No peripatellar tenderness. Full extension with flexion to approximately 135 degrees. No crepitus. No instability. Negative Seema's. Symmetrical palpable distal pulses.   No gross motor or sensory deficits in lower extremities. No distal edema. IMAGING:  Radiographs: XR Results (maximum last 2): Results from Appointment encounter on 12/02/21    XR PELV 1 OR 2 V    Narrative  Single weightbearing AP pelvis x-ray demonstrates normal proximal joint space. Probably has a hint of labral calcification. Underlying cam deformity. XR KNEE LT MIN 4 V    Narrative  Four views (AP, PA-flex, lateral & sunrise) of the right knee were taken and reviewed today using digital radiography which reveal mild medial joint space narrowing, moderate PF OA.      ASSESSMENT/PLAN:  1. Primary osteoarthritis of right knee  -     hyaluronate sodium, cross-linked (GEL-ONE) injection syrg 30 mg; 30 mg, Intra artICUlar, ONCE, 1 dose, On Thu 12/2/21 at 1600  2. Chronic pain of right knee  -     XR KNEE LT MIN 4 V; Future  3. Right hip pain  -     XR PELV 1 OR 2 V; Future    The xray and exam findings were discussed with the patient today. He has moderate radiographic arthritis of the right knee, patellofemoral joint is the worst.  Moderate medial compartment arthritis. Previous arthroscopy report last year describes grade 3 and 4 changes in the medial and lateral compartments. I do not think is quite ready for knee replacement yet. I do not think hip pathology is being referred to the knee. After discussion we will try viscosupplementation. Discussed risks. After sterile prep the right knee, 1 unit of Gel One was injected into the right knee joint under sterile conditions. Tolerated procedure well. If no improvement over the next few weeks, may return in a month for corticosteroid injection in the right knee. May also consider different prescription nonsteroidal anti-inflammatory. No follow-ups on file.     Review Of Systems  ROS     Positive for: Musculoskeletal    Last edited by Juvenal Rubalcava RN on 12/2/2021  2:00 PM. (History)         Patient denies any recent fever, chills, nausea, vomiting, chest pain, or shortness of breath. No Known Allergies    Current Outpatient Medications   Medication Sig    minocycline (MINOCIN, DYNACIN) 50 mg capsule Take 50 mg by mouth daily.  buPROPion XL (WELLBUTRIN XL) 300 mg XL tablet Take 300 mg by mouth every morning.  citalopram (CELEXA) 40 mg tablet Take 40 mg by mouth daily.  esomeprazole (NexIUM) 20 mg capsule Take 20 mg by mouth daily. No current facility-administered medications for this visit.        Past Medical History:   Diagnosis Date    Arrhythmia 2015    ATRIAL FLUTTER    Arthritis     Cancer ([facility] [address] 2021    PROSTATE    GERD (gastroesophageal reflux disease)     H/O motion sickness     Psychiatric disorder     ANXIETY,DEPRESSION        Past Surgical History:   Procedure Laterality Date    HX CHOLECYSTECTOMY      HX COLONOSCOPY      HX ENDOSCOPY      HX GI      HX ORTHOPAEDIC Left     SHOULDER    HX ORTHOPAEDIC Left     THUMB    HX ORTHOPAEDIC Right     THUMB    HX ORTHOPAEDIC Right     TORN MENICUS REPAIR    HX TONSILLECTOMY         Family History   Problem Relation Age of Onset    Macular Degen Mother     Arthritis-osteo Mother     Anesth Problems Mother         ALLERGIC TO NOVOCAINE    No Known Problems Father     No Known Problems Sister         Social History     Socioeconomic History    Marital status:      Spouse name: Not on file    Number of children: Not on file    Years of education: Not on file    Highest education level: Not on file   Occupational History    Not on file   Tobacco Use    Smoking status: Never Smoker    Smokeless tobacco: Never Used   Vaping Use    Vaping Use: Never used   Substance and Sexual Activity    Alcohol use: Yes     Comment: RARE    Drug use: Not Currently    Sexual activity: Not on file   Other Topics Concern    Not on file   Social History Narrative    Not on file     Social Determinants of Health     Financial Resource Strain:     Difficulty of Paying Living Expenses: Not on file Food Insecurity:     Worried About Running Out of Food in the Last Year: Not on file    Anne [place] of Food in the Last Year: Not on file   Transportation Needs:     Lack of Transportation (Medical): Not on file    Lack of Transportation (Non-Medical): Not on file   Physical Activity:     Days of Exercise per Week: Not on file    Minutes of Exercise per Session: Not on file   Stress:     Feeling of Stress : Not on file   Social Connections:     Frequency of Communication with Friends and Family: Not on file    Frequency of Social Gatherings with Friends and Family: Not on file    Attends Orthodoxy Services: Not on file    Active Member of [address] [place] [address] [employer] or Organizations: Not on file    Attends Club or Organization Meetings: Not on file    Marital Status: Not on file   Intimate Partner Violence:     Fear of Current or Ex-Partner: Not on file    Emotionally Abused: Not on file    Physically Abused: Not on file    Sexually Abused: Not on file   Housing Stability:     Unable to Pay for Housing in the Last Year: Not on file    Number of [place] in the Last Year: Not on file    Unstable Housing in the Last Year: Not on file       Orders Placed This Encounter    XR KNEE LT MIN 4 V     Room 3a     Standing Status:   Future     Number of Occurrences:   1     Standing Expiration Date:   2/2/2022    XR PELV 1 OR 2 V     Standing Status:   Future     Number of Occurrences:   1     Standing Expiration Date:   12/2/2022    hyaluronate sodium, cross-linked (GEL-ONE) injection syrg 30 mg        An electronic signature was used to authenticate this note.   -- Kwame Lerner MD

## 2022-01-20 ENCOUNTER — OFFICE VISIT (OUTPATIENT)
Dept: ORTHOPEDIC SURGERY | Age: 67
End: 2022-01-20
Payer: MEDICARE

## 2022-01-20 VITALS — WEIGHT: 230 LBS | BODY MASS INDEX: 34.07 KG/M2 | HEIGHT: 69 IN

## 2022-01-20 DIAGNOSIS — M17.11 PRIMARY OSTEOARTHRITIS OF RIGHT KNEE: Primary | ICD-10-CM

## 2022-01-20 PROCEDURE — 20610 DRAIN/INJ JOINT/BURSA W/O US: CPT | Performed by: PHYSICIAN ASSISTANT

## 2022-01-20 PROCEDURE — 1101F PT FALLS ASSESS-DOCD LE1/YR: CPT | Performed by: PHYSICIAN ASSISTANT

## 2022-01-20 PROCEDURE — G8427 DOCREV CUR MEDS BY ELIG CLIN: HCPCS | Performed by: PHYSICIAN ASSISTANT

## 2022-01-20 PROCEDURE — G8417 CALC BMI ABV UP PARAM F/U: HCPCS | Performed by: PHYSICIAN ASSISTANT

## 2022-01-20 PROCEDURE — G8536 NO DOC ELDER MAL SCRN: HCPCS | Performed by: PHYSICIAN ASSISTANT

## 2022-01-20 PROCEDURE — 3017F COLORECTAL CA SCREEN DOC REV: CPT | Performed by: PHYSICIAN ASSISTANT

## 2022-01-20 PROCEDURE — 99213 OFFICE O/P EST LOW 20 MIN: CPT | Performed by: PHYSICIAN ASSISTANT

## 2022-01-20 PROCEDURE — G8432 DEP SCR NOT DOC, RNG: HCPCS | Performed by: PHYSICIAN ASSISTANT

## 2022-01-20 RX ORDER — BUPIVACAINE HYDROCHLORIDE 5 MG/ML
5 INJECTION, SOLUTION EPIDURAL; INTRACAUDAL ONCE
Status: COMPLETED | OUTPATIENT
Start: 2022-01-20 | End: 2022-01-20

## 2022-01-20 RX ORDER — TRIAMCINOLONE ACETONIDE 40 MG/ML
40 INJECTION, SUSPENSION INTRA-ARTICULAR; INTRAMUSCULAR ONCE
Status: COMPLETED | OUTPATIENT
Start: 2022-01-20 | End: 2022-01-20

## 2022-01-20 RX ADMIN — BUPIVACAINE HYDROCHLORIDE 25 MG: 5 INJECTION, SOLUTION EPIDURAL; INTRACAUDAL at 14:59

## 2022-01-20 RX ADMIN — TRIAMCINOLONE ACETONIDE 40 MG: 40 INJECTION, SUSPENSION INTRA-ARTICULAR; INTRAMUSCULAR at 15:00

## 2022-01-20 NOTE — PROGRESS NOTES
Meli Ewing (: 1955) is a 73 y.o. male, patient, here for evaluation of the following chief complaint(s):  Knee Pain (right knee)       SUBJECTIVE/OBJECTIVE:  Meli Ewing presents today complaining of right knee pain. He was last seen at the beginning of December for persistent right knee pain. Gel injection at that time did not provide any relief. Gelling symptoms, pain at start up. Does not typically have wakening night pain. No specific difficulty with stairs. He continues to be active, walking his dog outside. He has taken anti-inflammatories in the past.  Denies any groin pain today. PHYSICAL EXAM:  Vitals: Ht 5' 9\" (1.753 m)   Wt 230 lb (104.3 kg)   BMI 33.97 kg/m²   Body mass index is 33.97 kg/m². 73y.o. year old M in no acute distress. Ambulates with a normal gait pattern. Negative Stinchfield on the right with well-preserved passive hip range of motion. Right knee neutral alignment. Healed arthroscopy scars. Skin warm, dry with no effusion. Anteromedial tenderness to palpation. Range of motion 0-135 degrees. Motor 5/5. No instability. No distal edema. Symmetrical palpable distal pulses. IMAGING:  Radiographs: Prior x-rays reviewed which reveal mild to moderate medial joint space narrowing, moderate patellofemoral OA. ASSESSMENT/PLAN:  1. Primary osteoarthritis of right knee  -     triamcinolone acetonide (KENALOG-40) 40 mg/mL injection 40 mg; 40 mg, Intra artICUlar, ONCE, 1 dose, On Thu 22 at 1500  -     bupivacaine (PF) (MARCAINE) 0.5 % (5 mg/mL) injection 25 mg; 25 mg (5 mL), Intra artICUlar, ONCE, 1 dose, On Thu 22 at 1500    The xray and exam findings were discussed with the patient today. Moderate right knee osteoarthritis. He elects to proceed with cortisone injection today as previous gel injection did not provide any relief. Discussed natural disease progression and eventual need for total knee as symptoms dictate.   If no relief from this cortisone injection, consider x-rays at his next visit as x-rays been just over 1 year since we x-rayed his knees last.    Discussed risks/benefits of cortisone injection and patient gave verbal consent. Under sterile conditions, the right knee was injected with 5cc 0.5% Bupivacaine and 1cc 40mg Triamcinolone Acetonide (Kenalog) intra-articularly, tolerated the procedure well. Return if symptoms worsen or fail to improve. Review Of Systems  ROS     Positive for: Musculoskeletal    Last edited by Arpan Smyth RN on 1/20/2022  2:18 PM. (History)         Patient denies any recent fever, chills, nausea, vomiting, chest pain, or shortness of breath. No Known Allergies    Current Outpatient Medications   Medication Sig    minocycline (MINOCIN, DYNACIN) 50 mg capsule Take 50 mg by mouth daily.  buPROPion XL (WELLBUTRIN XL) 300 mg XL tablet Take 300 mg by mouth every morning.  citalopram (CELEXA) 40 mg tablet Take 40 mg by mouth daily.  esomeprazole (NexIUM) 20 mg capsule Take 20 mg by mouth daily.      Current Facility-Administered Medications   Medication    triamcinolone acetonide (KENALOG-40) 40 mg/mL injection 40 mg    bupivacaine (PF) (MARCAINE) 0.5 % (5 mg/mL) injection 25 mg       Past Medical History:   Diagnosis Date    Arrhythmia 2015    ATRIAL FLUTTER    Arthritis     Cancer ([facility] [address] 2021    PROSTATE    GERD (gastroesophageal reflux disease)     H/O motion sickness     Psychiatric disorder     ANXIETY,DEPRESSION        Past Surgical History:   Procedure Laterality Date    HX CHOLECYSTECTOMY      HX COLONOSCOPY      HX ENDOSCOPY      HX GI      HX ORTHOPAEDIC Left     SHOULDER    HX ORTHOPAEDIC Left     THUMB    HX ORTHOPAEDIC Right     THUMB    HX ORTHOPAEDIC Right     TORN MENICUS REPAIR    HX TONSILLECTOMY         Family History   Problem Relation Age of Onset    Macular Degen Mother     OSTEOARTHRITIS Mother     Anesth Problems Mother         ALLERGIC TO NOVOCAINE  No Known Problems Father     No Known Problems Sister         Social History     Socioeconomic History    Marital status:      Spouse name: Not on file    Number of children: Not on file    Years of education: Not on file    Highest education level: Not on file   Occupational History    Not on file   Tobacco Use    Smoking status: Never Smoker    Smokeless tobacco: Never Used   Vaping Use    Vaping Use: Never used   Substance and Sexual Activity    Alcohol use: Yes     Comment: RARE    Drug use: Not Currently    Sexual activity: Not on file   Other Topics Concern    Not on file   Social History Narrative    Not on file     Social Determinants of Health     Financial Resource Strain:     Difficulty of Paying Living Expenses: Not on file   Food Insecurity:     Worried About Running Out of Food in the Last Year: Not on file    [place] of Food in the Last Year: Not on file   Transportation Needs:     Lack of Transportation (Medical): Not on file    Lack of Transportation (Non-Medical):  Not on file   Physical Activity:     Days of Exercise per Week: Not on file    Minutes of Exercise per Session: Not on file   Stress:     Feeling of Stress : Not on file   Social Connections:     Frequency of Communication with Friends and Family: Not on file    Frequency of Social Gatherings with Friends and Family: Not on file    Attends Synagogue Services: Not on file    Active Member of [address] [place] [address] [employer] or Organizations: Not on file    Attends Club or Organization Meetings: Not on file    Marital Status: Not on file   Intimate Partner Violence:     Fear of Current or Ex-Partner: Not on file    Emotionally Abused: Not on file    Physically Abused: Not on file    Sexually Abused: Not on file   Housing Stability:     Unable to Pay for Housing in the Last Year: Not on file    Number of [place] in the Last Year: Not on file    Unstable Housing in the Last Year: Not on file       Orders Placed This Encounter    triamcinolone acetonide (KENALOG-40) 40 mg/mL injection 40 mg    bupivacaine (PF) (MARCAINE) 0.5 % (5 mg/mL) injection 25 mg      Delvis Barrett M.D. was available for immediate consultation as the supervising physician. An electronic signature was used to authenticate this note.   -- Seth Roldan PA-C

## 2022-03-18 PROBLEM — C61 PROSTATE CA (HCC): Status: ACTIVE | Noted: 2021-04-20

## 2022-03-18 PROBLEM — S83.241A ACUTE MEDIAL MENISCUS TEAR OF RIGHT KNEE: Status: ACTIVE | Noted: 2020-09-25

## 2022-03-18 PROBLEM — M94.262 CHONDROMALACIA OF KNEE, LEFT: Status: ACTIVE | Noted: 2020-09-29

## 2022-03-19 PROBLEM — S83.272A COMPLEX TEAR OF LATERAL MENISCUS OF LEFT KNEE AS CURRENT INJURY: Status: ACTIVE | Noted: 2020-09-29

## 2022-04-20 ENCOUNTER — OFFICE VISIT (OUTPATIENT)
Dept: ORTHOPEDIC SURGERY | Age: 67
End: 2022-04-20
Payer: MEDICARE

## 2022-04-20 VITALS — WEIGHT: 230 LBS | HEIGHT: 69 IN | BODY MASS INDEX: 34.07 KG/M2

## 2022-04-20 DIAGNOSIS — M17.11 PRIMARY OSTEOARTHRITIS OF RIGHT KNEE: Primary | ICD-10-CM

## 2022-04-20 PROCEDURE — 20610 DRAIN/INJ JOINT/BURSA W/O US: CPT

## 2022-04-20 RX ORDER — BUPIVACAINE HYDROCHLORIDE 5 MG/ML
5 INJECTION, SOLUTION PERINEURAL ONCE
Status: COMPLETED | OUTPATIENT
Start: 2022-04-20 | End: 2022-04-20

## 2022-04-20 RX ORDER — TRIAMCINOLONE ACETONIDE 40 MG/ML
40 INJECTION, SUSPENSION INTRA-ARTICULAR; INTRAMUSCULAR ONCE
Status: COMPLETED | OUTPATIENT
Start: 2022-04-20 | End: 2022-04-20

## 2022-04-20 RX ADMIN — BUPIVACAINE HYDROCHLORIDE 25 MG: 5 INJECTION, SOLUTION PERINEURAL at 12:32

## 2022-04-20 RX ADMIN — TRIAMCINOLONE ACETONIDE 40 MG: 40 INJECTION, SUSPENSION INTRA-ARTICULAR; INTRAMUSCULAR at 12:32

## 2022-04-20 NOTE — LETTER
4/25/2022    Patient: Fe Garay   YOB: 1955   Date of Visit: 4/20/2022     Francesco Darling MD  [address]. [facility] [address] [address]  Via Fax: [phone]    Dear Francesco Darling MD,      Thank you for referring Mr. Sylvia Ramos to [facility] for evaluation. My notes for this consultation are attached. If you have questions, please do not hesitate to call me. I look forward to following your patient along with you.       Sincerely,    Andrey Malik MD

## 2022-04-25 NOTE — PROGRESS NOTES
Nadia Crook (: 1955) is a 73 y.o. male, patient, here for evaluation of the following chief complaint(s):  Knee Pain (right knee)       SUBJECTIVE/OBJECTIVE:  Nadia Crook presents today for persistent right knee pain. Known osteoarthritis. Corticosteroid injection in the right knee in January helped significantly. He has had rapid return of symptoms recently. Symptoms returned suddenly when he woke up 1 morning with severe pain. He does not have aching at rest.  He has significant start up pain, especially moving from sit to stand, navigating stairs. Once he gets moving he has nuisance type activity related symptoms, except for bent knee activities which cause severe pain. No aching at night. No significant mechanical symptoms. He has previously had viscosupplementation which did not work. PHYSICAL EXAM:  Vitals: Ht 5' 9\" (1.753 m)   Wt 230 lb (104.3 kg)   BMI 33.97 kg/m²   Body mass index is 33.97 kg/m². 73y.o. year old M, no distress. Obvious right knee pain when rising from seated position. Ambulates with a normal gait pattern. Negative Stinchfield on the right with well-preserved passive hip range of motion. Right knee neutral alignment. Healed arthroscopy scars. Skin warm, dry with no effusion. Anteromedial tenderness to palpation. Range of motion 0-135 degrees. Motor 5/5. No instability. No distal edema. Symmetrical palpable distal pulses. IMAGING:  Radiographs: XR Results (most recent):  Results from Appointment encounter on 22    XR KNEE RT MIN 4 V    Narrative  4 x-ray views of right knee including AP and PA flexion, lateral, sunrise demonstrate moderate loss of medial compartment joint space, with severe loss of lateral patellofemoral joint space. No significant osteophyte formation. ASSESSMENT/PLAN:  1.  Primary osteoarthritis of right knee  -     XR KNEE RT MIN 4 V; Future  -     bupivacaine HCl (MARCAINE) 0.5 % (5 mg/mL) injection 25 mg; 25 mg (5 mL), Other, ONCE, 1 dose, On Wed 4/20/22 at 1300  -     triamcinolone acetonide (KENALOG-40) 40 mg/mL injection 40 mg; 40 mg, Intra artICUlar, ONCE, 1 dose, On Wed 4/20/22 at 1300    The xray and exam findings were discussed with the patient today. Severe patellofemoral osteoarthritis with moderate loss of medial compartment joint space. We had an extensive discussion regarding all conservative treatment options, including prescription nonsteroidal anti-inflammatories, physical therapy to include quad strengthening, repeat corticosteroid injections and/or viscosupplementation. Also discussed surgical treatment options including total knee replacement. He and I agree he is not ready for surgery at this point. After answering many questions for him, we proceeded with corticosteroid injection in the right knee. Risks and benefits were discussed, verbal consent obtained. After sterile prep of right knee, 5 cc of 0.5% Marcaine and 40 mg of Kenalog were injected into right knee under sterile conditions. He tolerated procedure well. He will continue with quad strengthening exercises at home. Return as needed. Return if symptoms worsen or fail to improve. Review Of Systems  ROS     Positive for: Musculoskeletal    Last edited by Nagi Triana RN on 4/20/2022 11:22 AM. (History)         Patient denies any recent fever, chills, nausea, vomiting, chest pain, or shortness of breath. No Known Allergies    Current Outpatient Medications   Medication Sig    minocycline (MINOCIN, DYNACIN) 50 mg capsule Take 50 mg by mouth daily.  buPROPion XL (WELLBUTRIN XL) 300 mg XL tablet Take 300 mg by mouth every morning.  citalopram (CELEXA) 40 mg tablet Take 40 mg by mouth daily.  esomeprazole (NexIUM) 20 mg capsule Take 20 mg by mouth daily. No current facility-administered medications for this visit.        Past Medical History:   Diagnosis Date    Arrhythmia 2015    ATRIAL FLUTTER    Arthritis     Cancer ([facility] [address] 2021    PROSTATE    GERD (gastroesophageal reflux disease)     H/O motion sickness     Psychiatric disorder     ANXIETY,DEPRESSION        Past Surgical History:   Procedure Laterality Date    HX CHOLECYSTECTOMY      HX COLONOSCOPY      HX ENDOSCOPY      HX GI      HX ORTHOPAEDIC Left     SHOULDER    HX ORTHOPAEDIC Left     THUMB    HX ORTHOPAEDIC Right     THUMB    HX ORTHOPAEDIC Right     TORN MENICUS REPAIR    HX TONSILLECTOMY         Family History   Problem Relation Age of Onset    Macular Degen Mother     OSTEOARTHRITIS Mother     Anesth Problems Mother         ALLERGIC TO NOVOCAINE    No Known Problems Father     No Known Problems Sister         Social History     Socioeconomic History    Marital status:      Spouse name: Not on file    Number of children: Not on file    Years of education: Not on file    Highest education level: Not on file   Occupational History    Not on file   Tobacco Use    Smoking status: Never Smoker    Smokeless tobacco: Never Used   Vaping Use    Vaping Use: Never used   Substance and Sexual Activity    Alcohol use: Yes     Comment: RARE    Drug use: Not Currently    Sexual activity: Not on file   Other Topics Concern    Not on file   Social History Narrative    Not on file     Social Determinants of Health     Financial Resource Strain:     Difficulty of Paying Living Expenses: Not on file   Food Insecurity:     Worried About Running Out of Food in the Last Year: Not on file    Anne Marie of Food in the Last Year: Not on file   Transportation Needs:     Lack of Transportation (Medical): Not on file    Lack of Transportation (Non-Medical):  Not on file   Physical Activity:     Days of Exercise per Week: Not on file    Minutes of Exercise per Session: Not on file   Stress:     Feeling of Stress : Not on file   Social Connections:     Frequency of Communication with Friends and Family: Not on file    Frequency of Social Gatherings with Friends and Family: Not on file    Attends Mandaeism Services: Not on file    Active Member of Clubs or Organizations: Not on file    Attends Club or Organization Meetings: Not on file    Marital Status: Not on file   Intimate Partner Violence:     Fear of Current or Ex-Partner: Not on file    Emotionally Abused: Not on file    Physically Abused: Not on file    Sexually Abused: Not on file   Housing Stability:     Unable to Pay for Housing in the Last Year: Not on file    Number of [place] in the Last Year: Not on file    Unstable Housing in the Last Year: Not on file       Orders Placed This Encounter    XR KNEE RT MIN 4 V     Standing Status:   Future     Number of Occurrences:   1     Standing Expiration Date:   4/21/2023    bupivacaine HCl (MARCAINE) 0.5 % (5 mg/mL) injection 25 mg    triamcinolone acetonide (KENALOG-40) 40 mg/mL injection 40 mg        An electronic signature was used to authenticate this note.   -- Juno Foster MD

## 2022-08-31 ENCOUNTER — OFFICE VISIT (OUTPATIENT)
Dept: ORTHOPEDIC SURGERY | Age: 67
End: 2022-08-31
Payer: MEDICARE

## 2022-08-31 VITALS — HEIGHT: 69 IN | WEIGHT: 235 LBS | BODY MASS INDEX: 34.8 KG/M2

## 2022-08-31 DIAGNOSIS — M17.11 PRIMARY OSTEOARTHRITIS OF RIGHT KNEE: Primary | ICD-10-CM

## 2022-08-31 PROCEDURE — 20610 DRAIN/INJ JOINT/BURSA W/O US: CPT | Performed by: ORTHOPAEDIC SURGERY

## 2022-08-31 RX ORDER — TRIAMCINOLONE ACETONIDE 40 MG/ML
40 INJECTION, SUSPENSION INTRA-ARTICULAR; INTRAMUSCULAR ONCE
Status: DISCONTINUED | OUTPATIENT
Start: 2022-08-31 | End: 2022-08-31

## 2022-08-31 RX ORDER — TRIAMCINOLONE ACETONIDE 40 MG/ML
40 INJECTION, SUSPENSION INTRA-ARTICULAR; INTRAMUSCULAR ONCE
Status: COMPLETED | OUTPATIENT
Start: 2022-08-31 | End: 2022-08-31

## 2022-08-31 RX ORDER — BUPIVACAINE HYDROCHLORIDE 5 MG/ML
5 INJECTION, SOLUTION PERINEURAL ONCE
Status: DISCONTINUED | OUTPATIENT
Start: 2022-08-31 | End: 2022-08-31

## 2022-08-31 RX ORDER — BUPIVACAINE HYDROCHLORIDE 5 MG/ML
5 INJECTION, SOLUTION PERINEURAL ONCE
Status: COMPLETED | OUTPATIENT
Start: 2022-08-31 | End: 2022-08-31

## 2022-08-31 RX ADMIN — TRIAMCINOLONE ACETONIDE 40 MG: 40 INJECTION, SUSPENSION INTRA-ARTICULAR; INTRAMUSCULAR at 10:14

## 2022-08-31 RX ADMIN — BUPIVACAINE HYDROCHLORIDE 25 MG: 5 INJECTION, SOLUTION PERINEURAL at 10:14

## 2022-08-31 NOTE — LETTER
8/31/2022    Patient: Main Vega   YOB: 1955   Date of Visit: 8/31/2022     Kevin Hanna MD  [address] [place] [address] [facility] [address]  Via Fax: [phone]    Dear Kevin Hanna MD,      Thank you for referring Mr. Angelina Batista to [facility] for evaluation. My notes for this consultation are attached. If you have questions, please do not hesitate to call me. I look forward to following your patient along with you.       Sincerely,    Cedric Pacheco MD

## 2022-08-31 NOTE — PROGRESS NOTES
Shannon Gardner (: 1955) is a 78 y.o. male, patient, here for evaluation of the following chief complaint(s):  Knee Pain (Right )       SUBJECTIVE/OBJECTIVE:  Shannon Gardner presents today for repeat evaluation of right knee osteoarthritis pain. He has had a few corticosteroid injections this year, both of which helped significantly but only for a handful of weeks. Continues to have symptoms with bent knee activities, moving from sit to stand, navigating stairs. Difficulty squatting. Has nuisance type symptoms on level ground once he gets moving. Diclofenac helps somewhat. Generally no aching at night. Viscosupplementation in the past did not help. He is having left lateral midfoot pain that is limiting his activities much more than his right knee. PHYSICAL EXAM:  Vitals: Ht 5' 9\" (1.753 m)   Wt 235 lb (106.6 kg)   BMI 34.70 kg/m²   Body mass index is 34.7 kg/m². 78y.o. year old M, no distress. Ambulates without a limp. Pain-free motion right hip. Negative Stinchfield. Right knee is in neutral alignment. Healed arthroscopy scars. No effusion. No focal tenderness today. Range of motion is well-preserved, 0 to approximately 130 degrees. Negative Seema's. No instability. Symmetrical palpable distal pulses. No gross motor or sensory deficits in lower extremities. No distal edema. IMAGING:  Radiographs: Previous x-rays of right knee demonstrate mild to moderate loss of medial compartment joint space, with severe loss of lateral patellofemoral joint space. No significant osteophyte formation. ASSESSMENT/PLAN:  1.  Primary osteoarthritis of right knee  -     bupivacaine HCl (MARCAINE) 0.5 % (5 mg/mL) injection 25 mg; 25 mg (5 mL), Other, ONCE, 1 dose, On 22 at 1100  -     triamcinolone acetonide (KENALOG-40) 40 mg/mL injection 40 mg; 40 mg, Intra artICUlar, ONCE, 1 dose, On 22 at 1100    Severe patellofemoral arthritis of the right knee, with mild to moderate medial compartment arthritis. Discussed continued comprehensive conservative treatment measures. He will continue diclofenac. Home quad strengthening program.  He elects to proceed with repeat corticosteroid injection for some immediate relief. Discussed risks and benefits, verbal consent obtained. After sterile prep of right knee, 5 cc of 0.5% Marcaine and 40 mg of Kenalog were injected into right knee joint under sterile conditions. Tolerated procedure well. She will return to see me as needed. We will facilitate an appointment for him to see Dr. Zach Ruiz regarding his left foot pain, which right now is a much bigger issue for him. No follow-ups on file. Review Of Systems  ROS    Positive for: Musculoskeletal  Last edited by Samina Bardales on 8/31/2022 10:11 AM.         Patient denies any recent fever, chills, nausea, vomiting, chest pain, or shortness of breath. No Known Allergies    Current Outpatient Medications   Medication Sig    minocycline (MINOCIN, DYNACIN) 50 mg capsule Take 50 mg by mouth daily. buPROPion XL (WELLBUTRIN XL) 300 mg XL tablet Take 300 mg by mouth every morning. citalopram (CELEXA) 40 mg tablet Take 40 mg by mouth daily. esomeprazole (NEXIUM) 20 mg capsule Take 20 mg by mouth daily. No current facility-administered medications for this visit.        Past Medical History:   Diagnosis Date    Arrhythmia 2015    ATRIAL FLUTTER    Arthritis     Cancer ([facility] [address] 2021    PROSTATE    GERD (gastroesophageal reflux disease)     H/O motion sickness     Psychiatric disorder     ANXIETY,DEPRESSION        Past Surgical History:   Procedure Laterality Date    HX CHOLECYSTECTOMY      HX COLONOSCOPY      HX ENDOSCOPY      HX GI      HX ORTHOPAEDIC Left     SHOULDER    HX ORTHOPAEDIC Left     THUMB    HX ORTHOPAEDIC Right     THUMB    HX ORTHOPAEDIC Right     TORN MENICUS REPAIR    HX TONSILLECTOMY         Family History   Problem Relation Age of Onset    Macular Degen Mother     OSTEOARTHRITIS Mother     Anesth Problems Mother         ALLERGIC TO NOVOCAINE    No Known Problems Father     No Known Problems Sister         Social History     Socioeconomic History    Marital status:      Spouse name: Not on file    Number of children: Not on file    Years of education: Not on file    Highest education level: Not on file   Occupational History    Not on file   Tobacco Use    Smoking status: Never    Smokeless tobacco: Never   Vaping Use    Vaping Use: Never used   Substance and Sexual Activity    Alcohol use: Yes     Comment: RARE    Drug use: Not Currently    Sexual activity: Not on file   Other Topics Concern    Not on file   Social History Narrative    Not on file     Social Determinants of Health     Financial Resource Strain: Not on file   Food Insecurity: Not on file   Transportation Needs: Not on file   Physical Activity: Not on file   Stress: Not on file   Social Connections: Not on file   Intimate Partner Violence: Not on file   Housing Stability: Not on file       Orders Placed This Encounter    bupivacaine HCl (MARCAINE) 0.5 % (5 mg/mL) injection 25 mg    triamcinolone acetonide (KENALOG-40) 40 mg/mL injection 40 mg    DISCONTD: bupivacaine HCl (MARCAINE) 0.5 % (5 mg/mL) injection 25 mg    DISCONTD: triamcinolone acetonide (KENALOG-40) 40 mg/mL injection 40 mg        An electronic signature was used to authenticate this note.   -- Orin Cole MD

## 2022-09-06 ENCOUNTER — OFFICE VISIT (OUTPATIENT)
Dept: ORTHOPEDIC SURGERY | Age: 67
End: 2022-09-06
Payer: MEDICARE

## 2022-09-06 VITALS — HEIGHT: 69 IN | WEIGHT: 235 LBS | BODY MASS INDEX: 34.8 KG/M2

## 2022-09-06 DIAGNOSIS — M19.072 ARTHRITIS OF MIDTARSAL JOINT OF LEFT FOOT: Primary | ICD-10-CM

## 2022-09-06 DIAGNOSIS — M25.572 ACUTE LEFT ANKLE PAIN: ICD-10-CM

## 2022-09-06 DIAGNOSIS — M79.672 ACUTE FOOT PAIN, LEFT: ICD-10-CM

## 2022-09-06 PROCEDURE — G8417 CALC BMI ABV UP PARAM F/U: HCPCS | Performed by: ORTHOPAEDIC SURGERY

## 2022-09-06 PROCEDURE — G8432 DEP SCR NOT DOC, RNG: HCPCS | Performed by: ORTHOPAEDIC SURGERY

## 2022-09-06 PROCEDURE — G8427 DOCREV CUR MEDS BY ELIG CLIN: HCPCS | Performed by: ORTHOPAEDIC SURGERY

## 2022-09-06 PROCEDURE — 1123F ACP DISCUSS/DSCN MKR DOCD: CPT | Performed by: ORTHOPAEDIC SURGERY

## 2022-09-06 PROCEDURE — 1101F PT FALLS ASSESS-DOCD LE1/YR: CPT | Performed by: ORTHOPAEDIC SURGERY

## 2022-09-06 PROCEDURE — 3017F COLORECTAL CA SCREEN DOC REV: CPT | Performed by: ORTHOPAEDIC SURGERY

## 2022-09-06 PROCEDURE — L4387 NON-PNEUM WALK BOOT PRE OTS: HCPCS | Performed by: ORTHOPAEDIC SURGERY

## 2022-09-06 PROCEDURE — G8536 NO DOC ELDER MAL SCRN: HCPCS | Performed by: ORTHOPAEDIC SURGERY

## 2022-09-06 PROCEDURE — 99212 OFFICE O/P EST SF 10 MIN: CPT | Performed by: ORTHOPAEDIC SURGERY

## 2022-09-06 RX ORDER — METHYLPREDNISOLONE 4 MG/1
TABLET ORAL
Qty: 1 DOSE PACK | Refills: 0 | Status: SHIPPED | OUTPATIENT
Start: 2022-09-06 | End: 2022-09-18

## 2022-09-06 NOTE — LETTER
9/8/2022    Patient: Arian Pearl   YOB: 1955   Date of Visit: 9/6/2022     Dnais Cristina MD  [address] [place] [id number]  Via Fax: [phone]    Dear Danis Cristina MD,      Thank you for referring Mr. Frances Aguirre to [facility] for evaluation. My notes for this consultation are attached. If you have questions, please do not hesitate to call me. I look forward to following your patient along with you.       Sincerely,    Jurgen Purdy MD

## 2022-09-06 NOTE — PROGRESS NOTES
Allie Longoria (: 1955) is a 78 y.o. male, patient,here for evaluation of the following   Chief Complaint   Patient presents with    Foot Pain    Ankle Pain        ASSESSMENT/PLAN:  Below is the assessment and plan developed based on review of pertinent history, physical exam, labs, studies, and medications. 1. Arthritis of midtarsal joint of left foot  -     REFERRAL TO DME  -     DE NON-PNEUM WALK BOOT PRE OTS  -     methylPREDNISolone (MEDROL DOSEPACK) 4 mg tablet; Take 1 Tablet by mouth Specific Days and Specific Times for 6 days, THEN 1 Tablet Specific Days and Specific Times for 6 days. As instructed on packet, Normal, Disp-1 Dose Pack, R-0  2. Acute left ankle pain  -     XR STANDING ANKLE LT MIN 3 V; Future  3. Acute foot pain, left  -     XR STANDING FOOT LT MIN 3 V; Future  -     methylPREDNISolone (MEDROL DOSEPACK) 4 mg tablet; Take 1 Tablet by mouth Specific Days and Specific Times for 6 days, THEN 1 Tablet Specific Days and Specific Times for 6 days. As instructed on packet, Normal, Disp-1 Dose Pack, R-0      Patient is informed of findings on exam and radiographs reviewed, it appears she has tarsometatarsal joint midtarsal arthritis towards the lateral part of the midfoot and this is causing the pain and swelling, he is walking differently and this may contribute to some of the problems. I do recommend that shoe changes and insole changes once he recovers and his symptoms less severe. We will can start with a short boot and Medrol pack for anti-inflammatory treatment to see if we can calm the symptoms. We will see him back in approximately 4 to 6 weeks at which time no x-rays are needed. No follow-ups on file. No Known Allergies    Current Outpatient Medications   Medication Sig    methylPREDNISolone (MEDROL DOSEPACK) 4 mg tablet Take 1 Tablet by mouth Specific Days and Specific Times for 6 days, THEN 1 Tablet Specific Days and Specific Times for 6 days.  As instructed on packet minocycline (MINOCIN, DYNACIN) 50 mg capsule Take 50 mg by mouth daily. buPROPion XL (WELLBUTRIN XL) 300 mg XL tablet Take 300 mg by mouth every morning. citalopram (CELEXA) 40 mg tablet Take 40 mg by mouth daily. esomeprazole (NEXIUM) 20 mg capsule Take 20 mg by mouth daily. No current facility-administered medications for this visit.        Past Medical History:   Diagnosis Date    Arrhythmia 2015    ATRIAL FLUTTER    Arthritis     Cancer ([address] 2021    PROSTATE    GERD (gastroesophageal reflux disease)     H/O motion sickness     Psychiatric disorder     ANXIETY,DEPRESSION       Past Surgical History:   Procedure Laterality Date    HX CHOLECYSTECTOMY      HX COLONOSCOPY      HX ENDOSCOPY      HX GI      HX ORTHOPAEDIC Left     SHOULDER    HX ORTHOPAEDIC Left     THUMB    HX ORTHOPAEDIC Right     THUMB    HX ORTHOPAEDIC Right     TORN MENICUS REPAIR    HX TONSILLECTOMY         Family History   Problem Relation Age of Onset    Macular Degen Mother     OSTEOARTHRITIS Mother     Anesth Problems Mother         ALLERGIC TO NOVOCAINE    No Known Problems Father     No Known Problems Sister        Social History     Socioeconomic History    Marital status:      Spouse name: Not on file    Number of children: Not on file    Years of education: Not on file    Highest education level: Not on file   Occupational History    Not on file   Tobacco Use    Smoking status: Never    Smokeless tobacco: Never   Vaping Use    Vaping Use: Never used   Substance and Sexual Activity    Alcohol use: Yes     Comment: RARE    Drug use: Not Currently    Sexual activity: Not on file   Other Topics Concern    Not on file   Social History Narrative    Not on file     Social Determinants of Health     Financial Resource Strain: Not on file   Food Insecurity: Not on file   Transportation Needs: Not on file   Physical Activity: Not on file   Stress: Not on file   Social Connections: Not on file   Intimate Partner Violence: Not on file   Housing Stability: Not on file           Vitals:  Ht 5' 9\" (1.753 m)   Wt 235 lb (106.6 kg)   BMI 34.70 kg/m²    Body mass index is 34.7 kg/m². SUBJECTIVE:  Robert Nieves (: 1955)   New patient presents today with complaint of left foot pain that extends into the lateral border of the foot near the midfoot joints. Anytime he moves to invert the foot, he is experiencing pain. For the past 2 weeks the pain radiates to the top of the midfoot. The pain can be severe sharp stabbing pain that comes and goes and standing, running, walking make it worse. He has tried rest and anti-inflammatory medications. This problem ongoing for the past 4 weeks not getting better. He is not diabetic, non-smoker. OBJECTIVE EXAM:     Visit Vitals  Ht 5' 9\" (1.753 m)   Wt 235 lb (106.6 kg)   BMI 34.70 kg/m²       Appearance: Alert, well appearing and pleasant patient who is in no distress, oriented to person, place/time, and who follows commands. This patient is accompanied in the       office by his  self. Psychiatric: Affect and mood are appropriate. No dementia noted on examination  Musculoskeletal:  LOCATION: Diffuse tenderness to palpation around the TMT joints of lateral foot - left      Integumentary: No rashes, skin patches, wounds, or abrasions to the right or left legs       Warm and normal color. No regions of expressible drainage.       Gait: Normal      Tenderness: No tenderness        Motor/Strength/Tone Exam: Normal       Sensory Exam:   Intact Normal Sensation to ankle/foot      Stability Testing: No anterolateral or varus instability of the Ankle or Subtalar Joints               No peroneal tendon instability noted      ROM: Normal ROM noted to ankle/foot      Contractures: No Achilles or Gastrocnemius Contractures      Calf tenderness: Absent for calf or gastrocnemius muscle regions       Soft, supple, non tender, non taut lower extremity compartment  Alignment:      NEUTRAL Hindfoot,         none Metatarsus Adductus Metatarsus   Wounds/Abrasions:    None present  Extremities:   No embolic phenomena to the toes          No significant edema to the foot and or toes. Lower extremities are warm and appear well perfused    DVT: No evidence of DVT seen on examination at this time     No calf swelling, no tenderness to calf muscles  Lymphatic:  No Evidence of Lymphedema  Vascular: Medial Border of Tibia Region: Edema is not present         Pulses: Dorsalis Pedis &  Posterior Tibial Pulses : Palpable yes        Varicosities Lower Limbs :  None  noted  Neuro: Negative bilateral Straight leg raise (seated position)    See Musculoskeletal section for pertinent individual extremity examination    No abnormal hand/wrist, foot/ankle, or facial/neck tremors. Lower Extremity/Ankle/Foot:  Mild antalgic gait, satisfactory weightbearing stance. Left lower extremity/ankle: There is no malalignment or deformity, nontender, no swelling, ligaments stable, range of motion intact, negative Gutierrez test, negative ankle squeeze test.    Left foot: There is no severe malalignment or deformity but there is a tendency towards supination of foot when weightbearing, there is tenderness to palpation of the third, fourth and fifth tarsometatarsal joints, there is also a little bit of tenderness at the more proximal tarsal bones at the same side. There is mild swelling present. The medial midfoot joints are nontender. Forefoot metatarsals nontender, ligaments are grossly stable, toe range of motion is intact with strength 5/5, capillary refill brisk. Contralateral lower extremity/ankle /foot exam:  Nontender, no swelling ligaments grossly stable. Normal weightbearing stance. Neurovascular exam intact for light touch sensation, dorsalis pedis pulse palpable, strength for ankle and foot dorsiflexion/plantarflexion 5/5. Imaging:    XR Results (maximum last 2):   Results from Appointment encounter on 09/06/22    XR STANDING ANKLE LT MIN 3 V    Narrative  Left ankle AP, lateral and oblique standing x-rays show no acute fractures or dislocations, no acute abnormalities, normal ankle mortise, no joint space narrowing or signs of arthritis at the ankle joint space, again on lateral view noted some midfoot arthritis, os trigonum. XR STANDING FOOT LT MIN 3 V    Narrative  Left foot AP, lateral and oblique standing x-rays show no obvious fractures or dislocations, satisfactory bone density, there is mild arthritis at the navicular cuneiform joint noted with slight decrease of arch at that site, there is an os trigonum and a moderate size Parveen's. No acute abnormalities. Most significant arthritis at the midfoot appears to be the fourth and third TMT joints of the foot. This correlates well with examination. An electronic signature was used to authenticate this note.   -- Juan Plaza MD

## 2022-10-05 ENCOUNTER — OFFICE VISIT (OUTPATIENT)
Dept: ORTHOPEDIC SURGERY | Age: 67
End: 2022-10-05
Payer: MEDICARE

## 2022-10-05 VITALS — BODY MASS INDEX: 34.8 KG/M2 | HEIGHT: 69 IN | WEIGHT: 235 LBS

## 2022-10-05 DIAGNOSIS — M19.072 ARTHRITIS OF MIDTARSAL JOINT OF LEFT FOOT: Primary | ICD-10-CM

## 2022-10-05 DIAGNOSIS — M79.672 LEFT FOOT PAIN: ICD-10-CM

## 2022-10-05 PROCEDURE — 99212 OFFICE O/P EST SF 10 MIN: CPT | Performed by: ORTHOPAEDIC SURGERY

## 2022-10-05 PROCEDURE — 1101F PT FALLS ASSESS-DOCD LE1/YR: CPT | Performed by: ORTHOPAEDIC SURGERY

## 2022-10-05 PROCEDURE — G8417 CALC BMI ABV UP PARAM F/U: HCPCS | Performed by: ORTHOPAEDIC SURGERY

## 2022-10-05 PROCEDURE — 1123F ACP DISCUSS/DSCN MKR DOCD: CPT | Performed by: ORTHOPAEDIC SURGERY

## 2022-10-05 PROCEDURE — 3017F COLORECTAL CA SCREEN DOC REV: CPT | Performed by: ORTHOPAEDIC SURGERY

## 2022-10-05 PROCEDURE — G8427 DOCREV CUR MEDS BY ELIG CLIN: HCPCS | Performed by: ORTHOPAEDIC SURGERY

## 2022-10-05 PROCEDURE — G8536 NO DOC ELDER MAL SCRN: HCPCS | Performed by: ORTHOPAEDIC SURGERY

## 2022-10-05 PROCEDURE — G8432 DEP SCR NOT DOC, RNG: HCPCS | Performed by: ORTHOPAEDIC SURGERY

## 2022-10-05 RX ORDER — MELOXICAM 15 MG/1
15 TABLET ORAL
Qty: 20 TABLET | Refills: 1 | Status: SHIPPED | OUTPATIENT
Start: 2022-10-05

## 2022-10-05 NOTE — LETTER
10/10/2022    Patient: Remigio Milton   YOB: 1955   Date of Visit: 10/5/2022     Trinity Dan MD  [address] [address]  Via Fax: [phone]    Dear Trinity Dan MD,      Thank you for referring Mr. Jeyson Christianson to [facility] for evaluation. My notes for this consultation are attached. If you have questions, please do not hesitate to call me. I look forward to following your patient along with you.       Sincerely,    Obed Morales MD

## 2022-10-05 NOTE — PROGRESS NOTES
Anabella Dalal (: 1955) is a 78 y.o. male, patient,here for evaluation of the following   Chief Complaint   Patient presents with    Follow-up    Foot Pain        ASSESSMENT/PLAN:  Below is the assessment and plan developed based on review of pertinent history, physical exam, labs, studies, and medications. 1. Arthritis of midtarsal joint of left foot  -     meloxicam (MOBIC) 15 mg tablet; Take 1 Tablet by mouth daily as needed for Pain. Indications: joint damage causing pain and loss of function, Normal, Disp-20 Tablet, R-1  -     AMB SUPPLY ORDER  -     MRI FOOT LT WO CONT; Future  2. Left foot pain  -     meloxicam (MOBIC) 15 mg tablet; Take 1 Tablet by mouth daily as needed for Pain. Indications: joint damage causing pain and loss of function, Normal, Disp-20 Tablet, R-1  -     AMB SUPPLY ORDER  -     MRI FOOT LT WO CONT; Future    Patient informed of findings on exam, he is  and although there was some recovery with the Medrol pack, he is still concerned there could be something more than what I suspect it is which is arthritis. Because of concern and persistent pain that has not completely resolved with conservative treatment, I recommended an MRI without contrast left foot focusing on the area of fourth and fifth TMT joint and cuboid. In the meantime, recommend appropriate shoe wear and insoles and provided meloxicam to use as needed for pain. Return for Review MRI when completed, treatment as indicated. No Known Allergies    Current Outpatient Medications   Medication Sig    meloxicam (MOBIC) 15 mg tablet Take 1 Tablet by mouth daily as needed for Pain. Indications: joint damage causing pain and loss of function    minocycline (MINOCIN, DYNACIN) 50 mg capsule Take 50 mg by mouth daily. buPROPion XL (WELLBUTRIN XL) 300 mg XL tablet Take 300 mg by mouth every morning. citalopram (CELEXA) 40 mg tablet Take 40 mg by mouth daily.     esomeprazole (NEXIUM) 20 mg capsule Take 20 mg by mouth daily. No current facility-administered medications for this visit. Past Medical History:   Diagnosis Date    Arrhythmia     ATRIAL FLUTTER    Arthritis     Cancer ([address]     PROSTATE    GERD (gastroesophageal reflux disease)     H/O motion sickness     Psychiatric disorder     ANXIETY,DEPRESSION       Past Surgical History:   Procedure Laterality Date    HX CHOLECYSTECTOMY      HX COLONOSCOPY      HX ENDOSCOPY      HX GI      HX ORTHOPAEDIC Left     SHOULDER    HX ORTHOPAEDIC Left     THUMB    HX ORTHOPAEDIC Right     THUMB    HX ORTHOPAEDIC Right     TORN MENICUS REPAIR    HX TONSILLECTOMY         Family History   Problem Relation Age of Onset    Macular Degen Mother     OSTEOARTHRITIS Mother     Anesth Problems Mother         ALLERGIC TO NOVOCAINE    No Known Problems Father     No Known Problems Sister        Social History     Socioeconomic History    Marital status:      Spouse name: Not on file    Number of children: Not on file    Years of education: Not on file    Highest education level: Not on file   Occupational History    Not on file   Tobacco Use    Smoking status: Never    Smokeless tobacco: Never   Vaping Use    Vaping Use: Never used   Substance and Sexual Activity    Alcohol use: Yes     Comment: RARE    Drug use: Not Currently    Sexual activity: Not on file   Other Topics Concern    Not on file   Social History Narrative    Not on file     Social Determinants of Health     Financial Resource Strain: Not on file   Food Insecurity: Not on file   Transportation Needs: Not on file   Physical Activity: Not on file   Stress: Not on file   Social Connections: Not on file   Intimate Partner Violence: Not on file   Housing Stability: Not on file           Vitals:  Ht 5' 9\" (1.753 m)   Wt 235 lb (106.6 kg)   BMI 34.70 kg/m²    Body mass index is 34.7 kg/m².             SUBJECTIVE:  Kandy Longoria (: 1955)   Patient is back today for reevaluation of the left midfoot pain associated with arthritis. He also had a bit of pain into the ankle. This started about 2 to 3 months ago and he was provided a cortisone pack which he states helped his symptoms and he stopped using the boot about 3 weeks ago. Now the pain is worse again and the specific area is anywhere between the fourth and fifth TMT joints or the cuboid,      OBJECTIVE EXAM:     Visit Vitals  Ht 5' 9\" (1.753 m)   Wt 235 lb (106.6 kg)   BMI 34.70 kg/m²       Appearance: Alert, well appearing and pleasant patient who is in no distress, oriented to person, place/time, and who follows commands. This patient is accompanied in the       office by his  self. Psychiatric: Affect and mood are appropriate. No dementia noted on examination  Musculoskeletal:  LOCATION: Minimal swelling or tenderness to midfoot foot - left      Integumentary: No rashes, skin patches, wounds, or abrasions to the right or left legs       Warm and normal color. No regions of expressible drainage. Gait: Normal      Tenderness: No tenderness        Motor/Strength/Tone Exam: Normal       Sensory Exam:   Intact Normal Sensation to ankle/foot      Stability Testing: No anterolateral or varus instability of the Ankle or Subtalar Joints               No peroneal tendon instability noted      ROM: Normal ROM noted to ankle/foot      Contractures: No Achilles or Gastrocnemius Contractures      Calf tenderness: Absent for calf or gastrocnemius muscle regions       Soft, supple, non tender, non taut lower extremity compartment  Alignment:      NEUTRAL Hindfoot,         none Metatarsus Adductus Metatarsus   Wounds/Abrasions:    None present  Extremities:   No embolic phenomena to the toes          No significant edema to the foot and or toes.         Lower extremities are warm and appear well perfused    DVT: No evidence of DVT seen on examination at this time     No calf swelling, no tenderness to calf muscles  Lymphatic:  No Evidence of Lymphedema  Vascular: Medial Border of Tibia Region: Edema is not present         Pulses: Dorsalis Pedis &  Posterior Tibial Pulses : Palpable yes        Varicosities Lower Limbs :  None  noted  Neuro: Negative bilateral Straight leg raise (seated position)    See Musculoskeletal section for pertinent individual extremity examination    No abnormal hand/wrist, foot/ankle, or facial/neck tremors. Lower Extremity/Ankle/Foot:  Normal gait, normal weightbearing stance. Left lower extremity/ankle: Full active and passive range of motion intact, nontender, no swelling, ligament stable. Left foot: Tenderness to palpation near the fourth and fifth TMT joints and cuboid, there is mild swelling, no ecchymosis, no erythema, no fluctuance. Rest of foot nontender. Able to flex and extend toes satisfactory to motion strength. Contralateral lower extremity/ankle /foot exam:  Nontender, no swelling ligaments grossly stable. Normal weightbearing stance. Neurovascular exam is grossly intact. Imaging: We did not get new x-rays today, previous x-rays showed the possibility of a fourth and fifth TMT joint arthritis although not severe. Did not see fractures or dislocations. An electronic signature was used to authenticate this note.   -- Belinda Zayas MD

## 2022-10-27 ENCOUNTER — OFFICE VISIT (OUTPATIENT)
Dept: ORTHOPEDIC SURGERY | Age: 67
End: 2022-10-27
Payer: MEDICARE

## 2022-10-27 VITALS — HEIGHT: 69 IN | WEIGHT: 235 LBS | BODY MASS INDEX: 34.8 KG/M2

## 2022-10-27 DIAGNOSIS — M19.072 ARTHRITIS OF MIDTARSAL JOINT OF LEFT FOOT: Primary | ICD-10-CM

## 2022-10-27 DIAGNOSIS — M79.672 LEFT FOOT PAIN: ICD-10-CM

## 2022-10-27 PROCEDURE — G8427 DOCREV CUR MEDS BY ELIG CLIN: HCPCS | Performed by: ORTHOPAEDIC SURGERY

## 2022-10-27 PROCEDURE — G8536 NO DOC ELDER MAL SCRN: HCPCS | Performed by: ORTHOPAEDIC SURGERY

## 2022-10-27 PROCEDURE — 1101F PT FALLS ASSESS-DOCD LE1/YR: CPT | Performed by: ORTHOPAEDIC SURGERY

## 2022-10-27 PROCEDURE — 20605 DRAIN/INJ JOINT/BURSA W/O US: CPT | Performed by: ORTHOPAEDIC SURGERY

## 2022-10-27 PROCEDURE — G8417 CALC BMI ABV UP PARAM F/U: HCPCS | Performed by: ORTHOPAEDIC SURGERY

## 2022-10-27 PROCEDURE — 3017F COLORECTAL CA SCREEN DOC REV: CPT | Performed by: ORTHOPAEDIC SURGERY

## 2022-10-27 PROCEDURE — G8432 DEP SCR NOT DOC, RNG: HCPCS | Performed by: ORTHOPAEDIC SURGERY

## 2022-10-27 PROCEDURE — 99213 OFFICE O/P EST LOW 20 MIN: CPT | Performed by: ORTHOPAEDIC SURGERY

## 2022-10-27 PROCEDURE — 1123F ACP DISCUSS/DSCN MKR DOCD: CPT | Performed by: ORTHOPAEDIC SURGERY

## 2022-10-27 RX ORDER — TRIAMCINOLONE ACETONIDE 40 MG/ML
40 INJECTION, SUSPENSION INTRA-ARTICULAR; INTRAMUSCULAR ONCE
Status: COMPLETED | OUTPATIENT
Start: 2022-10-27 | End: 2022-10-27

## 2022-10-27 RX ORDER — BUPIVACAINE HYDROCHLORIDE 7.5 MG/ML
3 INJECTION, SOLUTION EPIDURAL; RETROBULBAR ONCE
Status: COMPLETED | OUTPATIENT
Start: 2022-10-27 | End: 2022-10-27

## 2022-10-27 RX ADMIN — BUPIVACAINE HYDROCHLORIDE 22.5 MG: 7.5 INJECTION, SOLUTION EPIDURAL; RETROBULBAR at 17:56

## 2022-10-27 RX ADMIN — TRIAMCINOLONE ACETONIDE 40 MG: 40 INJECTION, SUSPENSION INTRA-ARTICULAR; INTRAMUSCULAR at 17:58

## 2022-10-27 NOTE — PROGRESS NOTES
Jay Santos (: 1955) is a 78 y.o. male, patient,here for evaluation of the following   Chief Complaint   Patient presents with    Follow-up    Foot Pain        ASSESSMENT/PLAN:  Below is the assessment and plan developed based on review of pertinent history, physical exam, labs, studies, and medications. 1. Arthritis of midtarsal joint of left foot  -     bupivacaine (PF) (MARCAINE) 0.75 % (7.5 mg/mL) injection 22.5 mg; 22.5 mg (3 mL), SubCUTAneous, ONCE, 1 dose, On u 10/27/22 at 1800  -     triamcinolone acetonide (KENALOG-40) 40 mg/mL injection 40 mg; 40 mg, Intra artICUlar, ONCE, 1 dose, On u 10/27/22 at 1800  2. Left foot pain    Patient is informed of findings on the MRI which is reviewed with him in depth and I have answered all his questions regarding the results. He is informed of the arthritis around the anterior process of calcaneus which is where he is most tender and he also has TMT at the fourth and second TMT joint at most.  He is again most symptomatic near the sinus tarsi so we discussed proceeding with the cortisone injection into the area. After detailed discussion about the procedure of cortisone injection and answered all questions regarding the benefits, limitations, risk and potential complications, he elected to proceed with a cortisone injection into the left foot specifically sinus tarsi and anterior process of calcaneus. After verbal consent obtained, the left foot is sterilely prepped and the area is anesthetized using 3 cc of 0.75% Sensorcaine plain, once pain anesthesia achieved, the 1 cc of 40 mg/mL Kenalog is injected into the area of the anterior process of calcaneus and sinus tarsi without further complication. The patient tolerated the procedure well. The area is cleaned and dried and a Band-Aid applied. He is informed of postinjection pain flare and small risk of infection. He will return sooner if any problems arise.   Return if symptoms worsen or fail to improve. No Known Allergies    Current Outpatient Medications   Medication Sig    meloxicam (MOBIC) 15 mg tablet Take 1 Tablet by mouth daily as needed for Pain. Indications: joint damage causing pain and loss of function    minocycline (MINOCIN, DYNACIN) 50 mg capsule Take 50 mg by mouth daily. buPROPion XL (WELLBUTRIN XL) 300 mg XL tablet Take 300 mg by mouth every morning. citalopram (CELEXA) 40 mg tablet Take 40 mg by mouth daily. esomeprazole (NEXIUM) 20 mg capsule Take 20 mg by mouth daily. No current facility-administered medications for this visit.        Past Medical History:   Diagnosis Date    Arrhythmia 2015    ATRIAL FLUTTER    Arthritis     Cancer ([address] 2021    PROSTATE    GERD (gastroesophageal reflux disease)     H/O motion sickness     Psychiatric disorder     ANXIETY,DEPRESSION       Past Surgical History:   Procedure Laterality Date    HX CHOLECYSTECTOMY      HX COLONOSCOPY      HX ENDOSCOPY      HX GI      HX ORTHOPAEDIC Left     SHOULDER    HX ORTHOPAEDIC Left     THUMB    HX ORTHOPAEDIC Right     THUMB    HX ORTHOPAEDIC Right     TORN MENICUS REPAIR    HX TONSILLECTOMY         Family History   Problem Relation Age of Onset    Macular Degen Mother     OSTEOARTHRITIS Mother     Anesth Problems Mother         ALLERGIC TO NOVOCAINE    No Known Problems Father     No Known Problems Sister        Social History     Socioeconomic History    Marital status:      Spouse name: Not on file    Number of children: Not on file    Years of education: Not on file    Highest education level: Not on file   Occupational History    Not on file   Tobacco Use    Smoking status: Never    Smokeless tobacco: Never   Vaping Use    Vaping Use: Never used   Substance and Sexual Activity    Alcohol use: Yes     Comment: RARE    Drug use: Not Currently    Sexual activity: Not on file   Other Topics Concern    Not on file   Social History Narrative    Not on file     Social Determinants of Health Financial Resource Strain: Not on file   Food Insecurity: Not on file   Transportation Needs: Not on file   Physical Activity: Not on file   Stress: Not on file   Social Connections: Not on file   Intimate Partner Violence: Not on file   Housing Stability: Not on file           Vitals:  Ht 5' 9\" (1.753 m)   Wt 235 lb (106.6 kg)   BMI 34.70 kg/m²    Body mass index is 34.7 kg/m². SUBJECTIVE:  Joey Longoria (: 1955)   Patient is back today for reevaluation and review of MRI obtained. He had persistent pain/syndrome for an MRI and most of the pain is around the fourth and fifth tarsometatarsal joints. The pain extends into the sinus tarsi area. He also has some degenerative changes around the first MTP joint but those areas are not severely painful. OBJECTIVE EXAM:     Visit Vitals  Ht 5' 9\" (1.753 m)   Wt 235 lb (106.6 kg)   BMI 34.70 kg/m²       Appearance: Alert, well appearing and pleasant patient who is in no distress, oriented to person, place/time, and who follows commands. This patient is accompanied in the       office by his  self. Psychiatric: Affect and mood are appropriate. No dementia noted on examination  Musculoskeletal:  LOCATION: She is tenderness and swelling around the tarsometatarsal area of the midfoot foot - left      Integumentary: No rashes, skin patches, wounds, or abrasions to the right or left legs       Warm and normal color. No regions of expressible drainage.       Gait: Normal      Tenderness: No tenderness        Motor/Strength/Tone Exam: Normal       Sensory Exam:   Intact Normal Sensation to ankle/foot      Stability Testing: No anterolateral or varus instability of the Ankle or Subtalar Joints               No peroneal tendon instability noted      ROM: Normal ROM noted to ankle/foot      Contractures: No Achilles or Gastrocnemius Contractures      Calf tenderness: Absent for calf or gastrocnemius muscle regions       Soft, supple, non tender, non taut lower extremity compartment  Alignment:      NEUTRAL Hindfoot,         none Metatarsus Adductus Metatarsus   Wounds/Abrasions:    None present  Extremities:   No embolic phenomena to the toes          No significant edema to the foot and or toes. Lower extremities are warm and appear well perfused    DVT: No evidence of DVT seen on examination at this time     No calf swelling, no tenderness to calf muscles  Lymphatic:  No Evidence of Lymphedema  Vascular: Medial Border of Tibia Region: Edema is not present         Pulses: Dorsalis Pedis &  Posterior Tibial Pulses : Palpable yes        Varicosities Lower Limbs :  None  noted  Neuro: Negative bilateral Straight leg raise (seated position)    See Musculoskeletal section for pertinent individual extremity examination    No abnormal hand/wrist, foot/ankle, or facial/neck tremors. Lower Extremity/Ankle/Foot:  Mild antalgic gait, satisfactory weightbearing stance. Left lower extremity/ankle: Full active and passive range of motion intact, nontender, no swelling, ligament stable. Left foot: There is most tenderness around the fourth and fifth tarsometatarsal joint but also very tender around the sinus tarsi with deep palpation. There is mild swelling in the area. Rest of foot nontender. Contralateral lower extremity/ankle /foot exam:  Nontender, no swelling ligaments grossly stable. Normal weightbearing stance. Neurovascular exam is grossly intact light touch sensation, capillary fill, flexion/extension toes and ankle intact 5/5 strength    Imaging:    No x-rays were obtained today. Reviewed the MRI without contrast obtained on October 13, 2022. This confirms scattered degenerative changes in the midfoot and degenerative changes at the first MTP joint. There is subtalar extension arthritis at the anterior process of calcaneus as well as the proximal middle cuneiform and second TMT joint.   He is most symptomatic near the anterior process of calcaneus. All of this is reviewed with patient. The films were reviewed on PACS and the radiology report also reviewed confirms same findings. Summary of radiology report is below. IMPRESSION     1. Scattered degenerative changes in the midfoot as above  2. Degeneration first MTP joint  3. Tendinopathy of the peroneus brevis brevis and tenosynovitis of the peroneal tendons extending along the peroneal is longus along the undersurface of the foot    An electronic signature was used to authenticate this note.   -- Haile Jimenez MD

## 2022-10-27 NOTE — LETTER
11/1/2022    Patient: Sigifredo Martinez   YOB: 1955   Date of Visit: 10/27/2022     Alessandro Jack MD  [address] [place], [place] [address]shane Berry [phone]  Via Fax: [phone]    Dear Alessandro Jack MD,      Thank you for referring Mr. Kingston Silverio to [facility] for evaluation. My notes for this consultation are attached. If you have questions, please do not hesitate to call me. I look forward to following your patient along with you.       Sincerely,    Nilson Segal MD

## 2023-02-17 ENCOUNTER — OFFICE VISIT (OUTPATIENT)
Dept: ORTHOPEDIC SURGERY | Age: 68
End: 2023-02-17
Payer: MEDICARE

## 2023-02-17 VITALS — HEIGHT: 69 IN | BODY MASS INDEX: 34.07 KG/M2 | WEIGHT: 230 LBS

## 2023-02-17 DIAGNOSIS — M25.551 RIGHT HIP PAIN: ICD-10-CM

## 2023-02-17 DIAGNOSIS — M17.11 PRIMARY OSTEOARTHRITIS OF RIGHT KNEE: Primary | ICD-10-CM

## 2023-02-17 NOTE — LETTER
2/17/2023    Patient: Aashish Goins   YOB: 1955   Date of Visit: 2/17/2023     Sheeba Hansen MD  [address] [place], [place] [address]w [phone]  Via Fax: [phone]    Dear Sheeba Hansen MD,      Thank you for referring Mr. Maddi Jenkins to [facility] for evaluation. My notes for this consultation are attached. If you have questions, please do not hesitate to call me. I look forward to following your patient along with you.       Sincerely,    Brian Pearl MD

## 2023-02-17 NOTE — PROGRESS NOTES
Philomena Austin (: 1955) is a 78 y.o. male, patient, here for evaluation of the following chief complaint(s):  Knee Pain (Right )       SUBJECTIVE/OBJECTIVE:  Philomena Austin presents today for follow-up of right knee osteoarthritis. Symptoms have worsened since we last saw him. Corticosteroid injections are now only providing 1 or 2 weeks of relief. He has start up pain and activity related pain. He is quite limited. Difficulty getting in and out of the car, navigating stairs. He has to go up and down 2 feet on 1 step using handrail. Denies mechanical symptoms but does not trust the knee. Difficulty walking on uneven ground. Prescription nonsteroidal anti-inflammatories no longer help. He has had viscosupplementation in the past which did not help. He is very unhappy with progressive pain and dysfunction, declining quality of life. He is under evaluation for right inguinal hernia. CT scan has been ordered by Dr. Gill Mccoy.  He has intermittent groin pain. PHYSICAL EXAM:  Vitals: Ht 5' 9\" (1.753 m)   Wt 230 lb (104.3 kg)   BMI 33.97 kg/m²   Body mass index is 33.97 kg/m². 78y.o. year old M, no distress. Ambulates with mild limp at start of, then gait normalizes. Negative Stinchfield right hip. He does have mild groin discomfort on the right with extreme hip flexion and internal rotation. Hip range of motion is well-preserved. Neutral alignment right knee. Trace effusion but no warmth. Mild peripatellar tenderness and medial joint line tenderness. Full extension with flexion to approximately 120 degrees. Patella tracks centrally. No instability. Symmetrical palpable distal pulses. No gross motor or sensory deficits in lower extremities. No distal edema. IMAGING:  Radiographs: XR Results (maximum last 2):   Results from Appointment encounter on 23    XR PELV 1 OR 2 V    Narrative  Single weightbearing AP pelvis x-ray demonstrates very mild loss of right hip joint space.  Mild underlying cam deformity bilaterally. Lumbar spondylosis and degenerative disc disease evident. XR KNEE RT MIN 4 V    Narrative  4 x-ray views of right knee including AP and PA flexion, lateral, sunrise demonstrate severe osteoarthritis with moderate loss of medial compartment joint space and severe loss of lateral patellofemoral joint space. Small marginal osteophytes present in all 3 compartments. ASSESSMENT/PLAN:  1. Primary osteoarthritis of right knee  -     XR KNEE RT MIN 4 V; Future  2. Right hip pain  -     XR PELV 1 OR 2 V; Future    Its possible mild right groin symptoms are from the hip joint, but general surgeon did palpate and not consistent with inguinal hernia. He will finish that evaluation. Severe osteoarthritis of right knee. We discussed continued conservative treatment measures versus right total knee replacement. Symptoms have progressed despite comprehensive conservative treatment measures outlined above and he desires to proceed with right total knee replacement. We discussed risks, benefits, and alternatives in detail, as well as anticipated hospital stay and course of rehabilitation. He will see his primary care physician prior to surgery. All questions answered. Plan surgery at surgery center with same-day discharge. IV tranexamic acid intraoperatively, aspirin for DVT prophylaxis. No follow-ups on file. Review Of Systems  ROS    Positive for: Musculoskeletal  Last edited by Kyleigh List on 2/17/2023  8:44 AM.         Patient denies any recent fever, chills, nausea, vomiting, chest pain, or shortness of breath. No Known Allergies    Current Outpatient Medications   Medication Sig    meloxicam (MOBIC) 15 mg tablet Take 1 Tablet by mouth daily as needed for Pain. Indications: joint damage causing pain and loss of function    minocycline (MINOCIN, DYNACIN) 50 mg capsule Take 50 mg by mouth daily.     buPROPion XL (WELLBUTRIN XL) 300 mg XL tablet Take 300 mg by mouth every morning. citalopram (CELEXA) 40 mg tablet Take 40 mg by mouth daily. esomeprazole (NEXIUM) 20 mg capsule Take 20 mg by mouth daily. No current facility-administered medications for this visit.        Past Medical History:   Diagnosis Date    Arrhythmia 2015    ATRIAL FLUTTER    Arthritis     Cancer ([address] 2021    PROSTATE    GERD (gastroesophageal reflux disease)     H/O motion sickness     Psychiatric disorder     ANXIETY,DEPRESSION        Past Surgical History:   Procedure Laterality Date    HX CHOLECYSTECTOMY      HX COLONOSCOPY      HX ENDOSCOPY      HX GI      HX ORTHOPAEDIC Left     SHOULDER    HX ORTHOPAEDIC Left     THUMB    HX ORTHOPAEDIC Right     THUMB    HX ORTHOPAEDIC Right     TORN MENICUS REPAIR    HX TONSILLECTOMY         Family History   Problem Relation Age of Onset    Macular Degen Mother     OSTEOARTHRITIS Mother     Anesth Problems Mother         ALLERGIC TO NOVOCAINE    No Known Problems Father     No Known Problems Sister         Social History     Socioeconomic History    Marital status:      Spouse name: Not on file    Number of children: Not on file    Years of education: Not on file    Highest education level: Not on file   Occupational History    Not on file   Tobacco Use    Smoking status: Never    Smokeless tobacco: Never   Vaping Use    Vaping Use: Never used   Substance and Sexual Activity    Alcohol use: Yes     Comment: RARE    Drug use: Not Currently    Sexual activity: Not on file   Other Topics Concern    Not on file   Social History Narrative    Not on file     Social Determinants of Health     Financial Resource Strain: Not on file   Food Insecurity: Not on file   Transportation Needs: Not on file   Physical Activity: Not on file   Stress: Not on file   Social Connections: Not on file   Intimate Partner Violence: Not on file   Housing Stability: Not on file       Orders Placed This Encounter    XR KNEE RT MIN 4 V     Standing Status:   Future     Number of Occurrences:   1     Standing Expiration Date:   2/18/2024    XR PELV 1 OR 2 V     Standing Status:   Future     Number of Occurrences:   1     Standing Expiration Date:   2/17/2024        An electronic signature was used to authenticate this note.   -- Jacquelyn Jordan MD

## 2023-02-22 DIAGNOSIS — M17.11 PRIMARY OSTEOARTHRITIS OF RIGHT KNEE: Primary | ICD-10-CM

## 2023-02-24 ENCOUNTER — OFFICE VISIT (OUTPATIENT)
Dept: ORTHOPEDIC SURGERY | Age: 68
End: 2023-02-24

## 2023-02-24 VITALS — WEIGHT: 230 LBS | HEIGHT: 69 IN | BODY MASS INDEX: 34.07 KG/M2

## 2023-02-24 DIAGNOSIS — M79.672 LEFT FOOT PAIN: ICD-10-CM

## 2023-02-24 DIAGNOSIS — M25.572 SINUS TARSI SYNDROME OF LEFT FOOT: ICD-10-CM

## 2023-02-24 DIAGNOSIS — M19.072 PRIMARY OSTEOARTHRITIS OF LEFT FOOT: Primary | ICD-10-CM

## 2023-02-24 NOTE — PROGRESS NOTES
Andra Ta (: 1955) is a 78 y.o. male, patient,here for evaluation of the following   Chief Complaint   Patient presents with    Foot Pain     left        ASSESSMENT/PLAN:  Below is the assessment and plan developed based on review of pertinent history, physical exam, labs, studies, and medications. 1. Primary osteoarthritis of left foot  2. Sinus tarsi syndrome of left foot  3. Left foot pain    Patient elects to proceed with another cortisone injection as the previous injection in  really helped his symptoms and he would like to consider 1 more. He is again informed of the risk, benefits, limitations of procedure. After verbal consent obtained, the left foot is sterilely prepped and the area is anesthetized using 3 cc of 0.75% Sensorcaine plain, once pain anesthesia achieved, the 1 cc of betamethasone 6 mg is injected into the area of the left foot anterior process of calcaneus and sinus tarsi without further complication. The patient tolerated the procedure well. The area is cleaned and dried and a Band-Aid applied. He is informed of postinjection pain flare and small risk of infection. He will return sooner if any problems arise. I discussed management options with the patient. All questions were answered to the best of my ability and to the patient's satisfaction. I discussed their history, symptoms, physical exam findings, diagnostic testing results, diagnosis and treatment options. The patient verbalized understanding and elected to proceed with conservative treatment as above. Return if symptoms worsen or fail to improve. No Known Allergies    Current Outpatient Medications   Medication Sig    meloxicam (MOBIC) 15 mg tablet Take 1 Tablet by mouth daily as needed for Pain. Indications: joint damage causing pain and loss of function    minocycline (MINOCIN, DYNACIN) 50 mg capsule Take 50 mg by mouth daily.     buPROPion XL (WELLBUTRIN XL) 300 mg XL tablet Take 300 mg by mouth every morning. citalopram (CELEXA) 40 mg tablet Take 40 mg by mouth daily. esomeprazole (NEXIUM) 20 mg capsule Take 20 mg by mouth daily. No current facility-administered medications for this visit.        Past Medical History:   Diagnosis Date    Arrhythmia 2015    ATRIAL FLUTTER    Arthritis     Cancer ([facility] [address] 2021    PROSTATE    GERD (gastroesophageal reflux disease)     H/O motion sickness     Psychiatric disorder     ANXIETY,DEPRESSION       Past Surgical History:   Procedure Laterality Date    HX CHOLECYSTECTOMY      HX COLONOSCOPY      HX ENDOSCOPY      HX GI      HX ORTHOPAEDIC Left     SHOULDER    HX ORTHOPAEDIC Left     THUMB    HX ORTHOPAEDIC Right     THUMB    HX ORTHOPAEDIC Right     TORN MENICUS REPAIR    HX TONSILLECTOMY         Family History   Problem Relation Age of Onset    Macular Degen Mother     OSTEOARTHRITIS Mother     Anesth Problems Mother         ALLERGIC TO NOVOCAINE    No Known Problems Father     No Known Problems Sister        Social History     Socioeconomic History    Marital status:      Spouse name: Not on file    Number of children: Not on file    Years of education: Not on file    Highest education level: Not on file   Occupational History    Not on file   Tobacco Use    Smoking status: Never    Smokeless tobacco: Never   Vaping Use    Vaping Use: Never used   Substance and Sexual Activity    Alcohol use: Yes     Comment: RARE    Drug use: Not Currently    Sexual activity: Not on file   Other Topics Concern    Not on file   Social History Narrative    Not on file     Social Determinants of Health     Financial Resource Strain: Not on file   Food Insecurity: Not on file   Transportation Needs: Not on file   Physical Activity: Not on file   Stress: Not on file   Social Connections: Not on file   Intimate Partner Violence: Not on file   Housing Stability: Not on file           Vitals:  Ht 5' 9\" (1.753 m)   Wt 230 lb (104.3 kg)   BMI 33.97 kg/m²    Body mass index is 33.97 kg/m². SUBJECTIVE:  Bin Luis Longoria (: 1955)   Patient returns today specifically for cortisone injection, he has had 1 before and did very well for more than 8 months, he has degenerative changes to his foot and most of the pain is around the sinus tarsi. He has no other complaints other than recurrent pain. OBJECTIVE EXAM:     Visit Vitals  Ht 5' 9\" (1.753 m)   Wt 230 lb (104.3 kg)   BMI 33.97 kg/m²       Appearance: Alert, well appearing and pleasant patient who is in no distress, oriented to person, place/time, and who follows commands. This patient is accompanied in the       office by his  self. Psychiatric: Affect and mood are appropriate. No dementia noted on examination  Musculoskeletal:  LOCATION: Tenderness mild swelling sinus tarsi foot - left      Integumentary: No rashes, skin patches, wounds, or abrasions to the right or left legs       Warm and normal color. No regions of expressible drainage. Gait: Normal      Tenderness: No tenderness        Motor/Strength/Tone Exam: Normal       Sensory Exam:   Intact Normal Sensation to ankle/foot      Stability Testing: No anterolateral or varus instability of the Ankle or Subtalar Joints               No peroneal tendon instability noted      ROM: Normal ROM noted to ankle/foot      Contractures: No Achilles or Gastrocnemius Contractures      Calf tenderness: Absent for calf or gastrocnemius muscle regions       Soft, supple, non tender, non taut lower extremity compartment  Alignment:      NEUTRAL Hindfoot,         none Metatarsus Adductus Metatarsus   Wounds/Abrasions:    None present  Extremities:   No embolic phenomena to the toes          No significant edema to the foot and or toes.         Lower extremities are warm and appear well perfused    DVT: No evidence of DVT seen on examination at this time     No calf swelling, no tenderness to calf muscles  Lymphatic:  No Evidence of Lymphedema  Vascular: Medial Border of Tibia Region: Edema is not present         Pulses: Dorsalis Pedis &  Posterior Tibial Pulses : Palpable yes        Varicosities Lower Limbs :  None  noted  Neuro: Negative bilateral Straight leg raise (seated position)    See Musculoskeletal section for pertinent individual extremity examination    No abnormal hand/wrist, foot/ankle, or facial/neck tremors. Lower Extremity/Ankle/Foot:  Antalgic gait, satisfactory weightbearing stance. Left lower extremity/ankle: Full active and passive range of motion intact, nontender, no swelling, ligament stable. Left foot: There is most tenderness around the fourth and fifth tarsometatarsal joint but also very tender around the sinus tarsi with deep palpation. There is mild swelling in the area. Rest of foot nontender. Contralateral lower extremity/ankle /foot exam:  Nontender, no swelling ligaments grossly stable. Normal weightbearing stance. Neurovascular exam is grossly intact light touch sensation, capillary fill, flexion/extension toes and ankle intact 5/5 strength    Imaging:    X-rays obtained today, previous had shown arthritis hindfoot and midfoot. An electronic signature was used to authenticate this note.   -- Lisseth Humphreys MD

## 2023-02-24 NOTE — LETTER
3/1/2023    Patient: Jaspreet Lomax   YOB: 1955   Date of Visit: 2/24/2023     Mary Lou Walsh MD  [address] [place], [place] [address]f [phone]  Via Fax: [phone]    Dear Mary Lou Walsh MD,      Thank you for referring Mr. Jam Muller to [facility] for evaluation. My notes for this consultation are attached. If you have questions, please do not hesitate to call me. I look forward to following your patient along with you.       Sincerely,    Lisseth Humphreys MD

## 2023-03-01 RX ORDER — BETAMETHASONE SODIUM PHOSPHATE AND BETAMETHASONE ACETATE 3; 3 MG/ML; MG/ML
6 INJECTION, SUSPENSION INTRA-ARTICULAR; INTRALESIONAL; INTRAMUSCULAR; SOFT TISSUE ONCE
Status: COMPLETED | OUTPATIENT
Start: 2023-03-01 | End: 2023-03-01

## 2023-03-01 RX ORDER — BUPIVACAINE HYDROCHLORIDE 7.5 MG/ML
3 INJECTION, SOLUTION EPIDURAL; RETROBULBAR ONCE
Status: COMPLETED | OUTPATIENT
Start: 2023-03-01 | End: 2023-03-01

## 2023-03-01 RX ADMIN — BETAMETHASONE SODIUM PHOSPHATE AND BETAMETHASONE ACETATE 6 MG: 3; 3 INJECTION, SUSPENSION INTRA-ARTICULAR; INTRALESIONAL; INTRAMUSCULAR; SOFT TISSUE at 18:19

## 2023-03-01 RX ADMIN — BUPIVACAINE HYDROCHLORIDE 22.5 MG: 7.5 INJECTION, SOLUTION EPIDURAL; RETROBULBAR at 18:17

## 2023-04-19 DIAGNOSIS — Z96.651 STATUS POST RIGHT KNEE REPLACEMENT: Primary | ICD-10-CM

## 2023-04-19 RX ORDER — OXYCODONE HYDROCHLORIDE 5 MG/1
2.5-5 TABLET ORAL
Qty: 42 TABLET | Refills: 0 | Status: SHIPPED | OUTPATIENT
Start: 2023-04-19 | End: 2023-04-26

## 2023-04-28 DIAGNOSIS — Z96.651 STATUS POST RIGHT KNEE REPLACEMENT: Primary | ICD-10-CM

## 2023-04-28 RX ORDER — OXYCODONE HYDROCHLORIDE 5 MG/1
2.5-5 TABLET ORAL
Qty: 30 TABLET | Refills: 0 | Status: SHIPPED | OUTPATIENT
Start: 2023-04-28 | End: 2023-05-03

## 2023-05-05 ENCOUNTER — OFFICE VISIT (OUTPATIENT)
Dept: ORTHOPEDIC SURGERY | Age: 68
End: 2023-05-05

## 2023-05-05 VITALS — WEIGHT: 230 LBS | BODY MASS INDEX: 34.07 KG/M2 | HEIGHT: 69 IN

## 2023-05-05 DIAGNOSIS — Z09 SURGERY FOLLOW-UP: ICD-10-CM

## 2023-05-05 DIAGNOSIS — Z96.651 STATUS POST RIGHT KNEE REPLACEMENT: Primary | ICD-10-CM

## 2023-05-06 RX ORDER — MINOCYCLINE HYDROCHLORIDE 50 MG/1
50 CAPSULE ORAL DAILY
COMMUNITY

## 2023-05-06 RX ORDER — BUPROPION HYDROCHLORIDE 300 MG/1
300 TABLET ORAL EVERY MORNING
COMMUNITY

## 2023-05-06 RX ORDER — CITALOPRAM 40 MG/1
40 TABLET ORAL DAILY
COMMUNITY

## 2023-05-06 RX ORDER — MELOXICAM 15 MG/1
15 TABLET ORAL DAILY PRN
COMMUNITY
Start: 2022-10-05

## 2023-10-10 ENCOUNTER — HOSPITAL ENCOUNTER (OUTPATIENT)
Facility: HOSPITAL | Age: 68
Discharge: HOME | End: 2023-10-13
Attending: ORTHOPAEDIC SURGERY
Payer: MEDICARE

## 2023-10-10 DIAGNOSIS — G89.29 CHRONIC LEFT SHOULDER PAIN: ICD-10-CM

## 2023-10-10 DIAGNOSIS — M25.512 CHRONIC LEFT SHOULDER PAIN: ICD-10-CM

## 2023-10-10 PROCEDURE — 20605 DRAIN/INJ JOINT/BURSA W/O US: CPT

## 2023-10-10 PROCEDURE — 6360000004 HC RX CONTRAST MEDICATION: Performed by: RADIOLOGY

## 2023-10-10 PROCEDURE — 6360000002 HC RX W HCPCS: Performed by: RADIOLOGY

## 2023-10-10 RX ORDER — BUPIVACAINE HYDROCHLORIDE 5 MG/ML
30 INJECTION, SOLUTION EPIDURAL; INTRACAUDAL ONCE
Status: COMPLETED | OUTPATIENT
Start: 2023-10-10 | End: 2023-10-10

## 2023-10-10 RX ORDER — TRIAMCINOLONE ACETONIDE 40 MG/ML
40 INJECTION, SUSPENSION INTRA-ARTICULAR; INTRAMUSCULAR ONCE
Status: COMPLETED | OUTPATIENT
Start: 2023-10-10 | End: 2023-10-10

## 2023-10-10 RX ADMIN — IOHEXOL 3 ML: 180 INJECTION INTRAVENOUS at 11:56

## 2023-10-10 RX ADMIN — BUPIVACAINE HYDROCHLORIDE 5 MG: 5 INJECTION, SOLUTION EPIDURAL; INTRACAUDAL at 11:56

## 2023-10-10 RX ADMIN — TRIAMCINOLONE ACETONIDE 40 MG: 40 INJECTION, SUSPENSION INTRA-ARTICULAR; INTRAMUSCULAR at 11:56

## 2023-10-10 ASSESSMENT — PAIN SCALES - GENERAL: PAINLEVEL_OUTOF10: 7

## 2024-03-01 ENCOUNTER — HOSPITAL ENCOUNTER (OUTPATIENT)
Facility: HOSPITAL | Age: 69
End: 2024-03-01
Attending: ORTHOPAEDIC SURGERY
Payer: MEDICARE

## 2024-03-01 DIAGNOSIS — M23.204 DEGENERATIVE TEAR OF LEFT MEDIAL MENISCUS: ICD-10-CM

## 2024-03-01 PROCEDURE — 73721 MRI JNT OF LWR EXTRE W/O DYE: CPT

## 2024-03-15 ENCOUNTER — TRANSCRIBE ORDERS (OUTPATIENT)
Facility: HOSPITAL | Age: 69
End: 2024-03-15

## 2024-03-15 ENCOUNTER — HOSPITAL ENCOUNTER (OUTPATIENT)
Facility: HOSPITAL | Age: 69
Discharge: HOME | End: 2024-03-18
Payer: MEDICARE

## 2024-03-15 DIAGNOSIS — Z01.818 PREOP EXAMINATION: Primary | ICD-10-CM

## 2024-03-15 DIAGNOSIS — Z01.818 PREOP EXAMINATION: ICD-10-CM

## 2024-03-15 LAB
EKG ATRIAL RATE: 76 BPM
EKG DIAGNOSIS: NORMAL
EKG P AXIS: 55 DEGREES
EKG P-R INTERVAL: 158 MS
EKG Q-T INTERVAL: 444 MS
EKG QRS DURATION: 90 MS
EKG QTC CALCULATION (BAZETT): 499 MS
EKG R AXIS: 1 DEGREES
EKG T AXIS: 37 DEGREES
EKG VENTRICULAR RATE: 76 BPM

## 2024-03-15 PROCEDURE — 93005 ELECTROCARDIOGRAM TRACING: CPT

## 2024-03-15 PROCEDURE — 93010 ELECTROCARDIOGRAM REPORT: CPT | Performed by: INTERNAL MEDICINE
